# Patient Record
Sex: MALE | Race: BLACK OR AFRICAN AMERICAN | Employment: OTHER | URBAN - METROPOLITAN AREA
[De-identification: names, ages, dates, MRNs, and addresses within clinical notes are randomized per-mention and may not be internally consistent; named-entity substitution may affect disease eponyms.]

---

## 2017-02-21 PROBLEM — I49.5 SICK SINUS SYNDROME (HCC): Status: ACTIVE | Noted: 2017-02-21

## 2017-11-07 PROBLEM — I77.9 BILATERAL CAROTID ARTERY DISEASE (HCC): Status: ACTIVE | Noted: 2017-11-07

## 2018-01-23 PROBLEM — E11.21 TYPE 2 DIABETES MELLITUS WITH NEPHROPATHY (HCC): Status: ACTIVE | Noted: 2018-01-23

## 2018-04-16 PROBLEM — R42 RECURRENT VERTIGO: Status: ACTIVE | Noted: 2018-04-16

## 2018-06-27 ENCOUNTER — HOSPITAL ENCOUNTER (OUTPATIENT)
Dept: CT IMAGING | Age: 78
Discharge: HOME | End: 2018-06-27
Attending: OTOLARYNGOLOGY
Payer: MEDICARE

## 2018-06-27 DIAGNOSIS — J32.9 CHRONIC SINUSITIS, UNSPECIFIED LOCATION: ICD-10-CM

## 2018-06-27 PROCEDURE — 70486 CT MAXILLOFACIAL W/O DYE: CPT

## 2018-10-26 PROBLEM — R10.32 LEFT GROIN PAIN: Status: ACTIVE | Noted: 2018-10-26

## 2019-03-20 PROBLEM — R07.2 PRECORDIAL PAIN: Status: ACTIVE | Noted: 2019-03-20

## 2019-03-29 PROBLEM — N18.30 STAGE 3 CHRONIC KIDNEY DISEASE (HCC): Status: ACTIVE | Noted: 2019-03-29

## 2019-07-02 ENCOUNTER — HOSPITAL ENCOUNTER (OUTPATIENT)
Dept: LAB | Age: 79
Discharge: HOME | End: 2019-07-02
Payer: MEDICARE

## 2019-07-02 DIAGNOSIS — R07.2 PRECORDIAL PAIN: ICD-10-CM

## 2019-07-02 LAB
ANION GAP SERPL CALC-SCNC: 6 MMOL/L (ref 7–16)
BASOPHILS # BLD: 0 K/UL (ref 0–0.2)
BASOPHILS NFR BLD: 0 % (ref 0–2)
BNP SERPL-MCNC: 246 PG/ML
BUN SERPL-MCNC: 15 MG/DL (ref 8–23)
CALCIUM SERPL-MCNC: 9.1 MG/DL (ref 8.3–10.4)
CHLORIDE SERPL-SCNC: 106 MMOL/L (ref 98–107)
CO2 SERPL-SCNC: 28 MMOL/L (ref 21–32)
CREAT SERPL-MCNC: 1.65 MG/DL (ref 0.8–1.5)
DIFFERENTIAL METHOD BLD: ABNORMAL
EOSINOPHIL # BLD: 0.2 K/UL (ref 0–0.8)
EOSINOPHIL NFR BLD: 3 % (ref 0.5–7.8)
ERYTHROCYTE [DISTWIDTH] IN BLOOD BY AUTOMATED COUNT: 13.9 % (ref 11.9–14.6)
GLUCOSE SERPL-MCNC: 219 MG/DL (ref 65–100)
HCT VFR BLD AUTO: 44.2 % (ref 41.1–50.3)
HGB BLD-MCNC: 14.1 G/DL (ref 13.6–17.2)
IMM GRANULOCYTES # BLD AUTO: 0 K/UL (ref 0–0.5)
IMM GRANULOCYTES NFR BLD AUTO: 0 % (ref 0–5)
INR PPP: 1.3
LYMPHOCYTES # BLD: 1.7 K/UL (ref 0.5–4.6)
LYMPHOCYTES NFR BLD: 27 % (ref 13–44)
MCH RBC QN AUTO: 29.6 PG (ref 26.1–32.9)
MCHC RBC AUTO-ENTMCNC: 31.9 G/DL (ref 31.4–35)
MCV RBC AUTO: 92.9 FL (ref 79.6–97.8)
MONOCYTES # BLD: 0.6 K/UL (ref 0.1–1.3)
MONOCYTES NFR BLD: 10 % (ref 4–12)
NEUTS SEG # BLD: 3.8 K/UL (ref 1.7–8.2)
NEUTS SEG NFR BLD: 60 % (ref 43–78)
NRBC # BLD: 0 K/UL (ref 0–0.2)
PLATELET # BLD AUTO: 144 K/UL (ref 150–450)
PMV BLD AUTO: 11.6 FL (ref 9.4–12.3)
POTASSIUM SERPL-SCNC: 4.3 MMOL/L (ref 3.5–5.1)
PROTHROMBIN TIME: 16.4 SEC (ref 11.7–14.5)
RBC # BLD AUTO: 4.76 M/UL (ref 4.23–5.6)
SODIUM SERPL-SCNC: 140 MMOL/L (ref 136–145)
WBC # BLD AUTO: 6.4 K/UL (ref 4.3–11.1)

## 2019-07-02 PROCEDURE — 85025 COMPLETE CBC W/AUTO DIFF WBC: CPT

## 2019-07-02 PROCEDURE — 80048 BASIC METABOLIC PNL TOTAL CA: CPT

## 2019-07-02 PROCEDURE — 36415 COLL VENOUS BLD VENIPUNCTURE: CPT

## 2019-07-02 PROCEDURE — 85610 PROTHROMBIN TIME: CPT

## 2019-07-02 PROCEDURE — 83880 ASSAY OF NATRIURETIC PEPTIDE: CPT

## 2019-07-03 ENCOUNTER — HOSPITAL ENCOUNTER (OUTPATIENT)
Dept: CARDIAC CATH/INVASIVE PROCEDURES | Age: 79
Discharge: HOME | End: 2019-07-03
Attending: INTERNAL MEDICINE | Admitting: INTERNAL MEDICINE
Payer: MEDICARE

## 2019-07-03 VITALS
SYSTOLIC BLOOD PRESSURE: 160 MMHG | RESPIRATION RATE: 16 BRPM | HEART RATE: 69 BPM | WEIGHT: 220 LBS | TEMPERATURE: 98.1 F | HEIGHT: 75 IN | BODY MASS INDEX: 27.35 KG/M2 | DIASTOLIC BLOOD PRESSURE: 71 MMHG | OXYGEN SATURATION: 98 %

## 2019-07-03 LAB
ALBUMIN SERPL-MCNC: 3.8 G/DL (ref 3.2–4.6)
ALBUMIN/GLOB SERPL: 1.1 {RATIO} (ref 1.2–3.5)
ALP SERPL-CCNC: 118 U/L (ref 50–136)
ALT SERPL-CCNC: 23 U/L (ref 12–65)
ANION GAP SERPL CALC-SCNC: 6 MMOL/L (ref 7–16)
AST SERPL-CCNC: 18 U/L (ref 15–37)
BILIRUB SERPL-MCNC: 0.4 MG/DL (ref 0.2–1.1)
BUN SERPL-MCNC: 13 MG/DL (ref 8–23)
CALCIUM SERPL-MCNC: 8.9 MG/DL (ref 8.3–10.4)
CHLORIDE SERPL-SCNC: 107 MMOL/L (ref 98–107)
CO2 SERPL-SCNC: 28 MMOL/L (ref 21–32)
CREAT SERPL-MCNC: 1.59 MG/DL (ref 0.8–1.5)
GLOBULIN SER CALC-MCNC: 3.5 G/DL (ref 2.3–3.5)
GLUCOSE SERPL-MCNC: 167 MG/DL (ref 65–100)
POTASSIUM SERPL-SCNC: 4.7 MMOL/L (ref 3.5–5.1)
PROT SERPL-MCNC: 7.3 G/DL (ref 6.3–8.2)
SODIUM SERPL-SCNC: 141 MMOL/L (ref 136–145)

## 2019-07-03 PROCEDURE — 99152 MOD SED SAME PHYS/QHP 5/>YRS: CPT

## 2019-07-03 PROCEDURE — 77030029997 HC DEV COM RDL R BND TELE -B

## 2019-07-03 PROCEDURE — 77030004559 HC CATH ANGI DX SUPT CARD -B

## 2019-07-03 PROCEDURE — 77030012678 HC VLV HEMSTAS ABBT -B

## 2019-07-03 PROCEDURE — 77030004558 HC CATH ANGI DX SUPR TORQ CARD -A

## 2019-07-03 PROCEDURE — 74011000250 HC RX REV CODE- 250: Performed by: INTERNAL MEDICINE

## 2019-07-03 PROCEDURE — 80053 COMPREHEN METABOLIC PANEL: CPT

## 2019-07-03 PROCEDURE — C1894 INTRO/SHEATH, NON-LASER: HCPCS

## 2019-07-03 PROCEDURE — C1769 GUIDE WIRE: HCPCS

## 2019-07-03 PROCEDURE — 74011250636 HC RX REV CODE- 250/636: Performed by: INTERNAL MEDICINE

## 2019-07-03 PROCEDURE — 74011250636 HC RX REV CODE- 250/636

## 2019-07-03 PROCEDURE — 74011636320 HC RX REV CODE- 636/320: Performed by: INTERNAL MEDICINE

## 2019-07-03 PROCEDURE — 99153 MOD SED SAME PHYS/QHP EA: CPT

## 2019-07-03 PROCEDURE — 93458 L HRT ARTERY/VENTRICLE ANGIO: CPT

## 2019-07-03 RX ORDER — GUAIFENESIN 100 MG/5ML
81-324 LIQUID (ML) ORAL ONCE
Status: DISCONTINUED | OUTPATIENT
Start: 2019-07-03 | End: 2019-07-03 | Stop reason: HOSPADM

## 2019-07-03 RX ORDER — SODIUM CHLORIDE 0.9 % (FLUSH) 0.9 %
5-40 SYRINGE (ML) INJECTION AS NEEDED
Status: CANCELLED | OUTPATIENT
Start: 2019-07-03

## 2019-07-03 RX ORDER — NALOXONE HYDROCHLORIDE 0.4 MG/ML
0.4 INJECTION, SOLUTION INTRAMUSCULAR; INTRAVENOUS; SUBCUTANEOUS AS NEEDED
Status: CANCELLED | OUTPATIENT
Start: 2019-07-03

## 2019-07-03 RX ORDER — MORPHINE SULFATE 2 MG/ML
1 INJECTION, SOLUTION INTRAMUSCULAR; INTRAVENOUS
Status: CANCELLED | OUTPATIENT
Start: 2019-07-03

## 2019-07-03 RX ORDER — ONDANSETRON 2 MG/ML
4 INJECTION INTRAMUSCULAR; INTRAVENOUS
Status: CANCELLED | OUTPATIENT
Start: 2019-07-03 | End: 2019-07-04

## 2019-07-03 RX ORDER — SODIUM CHLORIDE 9 MG/ML
75 INJECTION, SOLUTION INTRAVENOUS CONTINUOUS
Status: CANCELLED | OUTPATIENT
Start: 2019-07-03

## 2019-07-03 RX ORDER — SODIUM CHLORIDE 9 MG/ML
75 INJECTION, SOLUTION INTRAVENOUS CONTINUOUS
Status: DISCONTINUED | OUTPATIENT
Start: 2019-07-03 | End: 2019-07-03 | Stop reason: HOSPADM

## 2019-07-03 RX ORDER — FENTANYL CITRATE 50 UG/ML
25-100 INJECTION, SOLUTION INTRAMUSCULAR; INTRAVENOUS
Status: DISCONTINUED | OUTPATIENT
Start: 2019-07-03 | End: 2019-07-03 | Stop reason: HOSPADM

## 2019-07-03 RX ORDER — HYDROCODONE BITARTRATE AND ACETAMINOPHEN 5; 325 MG/1; MG/1
1 TABLET ORAL
Status: CANCELLED | OUTPATIENT
Start: 2019-07-03

## 2019-07-03 RX ORDER — HEPARIN SODIUM 200 [USP'U]/100ML
2 INJECTION, SOLUTION INTRAVENOUS CONTINUOUS
Status: DISCONTINUED | OUTPATIENT
Start: 2019-07-03 | End: 2019-07-03 | Stop reason: HOSPADM

## 2019-07-03 RX ORDER — ACETAMINOPHEN 325 MG/1
650 TABLET ORAL
Status: CANCELLED | OUTPATIENT
Start: 2019-07-03

## 2019-07-03 RX ORDER — MIDAZOLAM HYDROCHLORIDE 1 MG/ML
.5-2 INJECTION, SOLUTION INTRAMUSCULAR; INTRAVENOUS
Status: DISCONTINUED | OUTPATIENT
Start: 2019-07-03 | End: 2019-07-03 | Stop reason: HOSPADM

## 2019-07-03 RX ORDER — SODIUM CHLORIDE 0.9 % (FLUSH) 0.9 %
5-40 SYRINGE (ML) INJECTION EVERY 8 HOURS
Status: CANCELLED | OUTPATIENT
Start: 2019-07-03

## 2019-07-03 RX ORDER — DIAZEPAM 5 MG/1
5 TABLET ORAL ONCE
Status: DISCONTINUED | OUTPATIENT
Start: 2019-07-03 | End: 2019-07-03 | Stop reason: HOSPADM

## 2019-07-03 RX ORDER — LIDOCAINE HYDROCHLORIDE 10 MG/ML
2-20 INJECTION INFILTRATION; PERINEURAL
Status: DISCONTINUED | OUTPATIENT
Start: 2019-07-03 | End: 2019-07-03 | Stop reason: HOSPADM

## 2019-07-03 RX ADMIN — HEPARIN SODIUM 2 ML/HR: 5000 INJECTION, SOLUTION INTRAVENOUS; SUBCUTANEOUS at 12:27

## 2019-07-03 RX ADMIN — IOPAMIDOL 85 ML: 755 INJECTION, SOLUTION INTRAVENOUS at 13:03

## 2019-07-03 RX ADMIN — SODIUM CHLORIDE 75 ML/HR: 900 INJECTION, SOLUTION INTRAVENOUS at 10:05

## 2019-07-03 RX ADMIN — VERAPAMIL HYDROCHLORIDE 2 ML: 2.5 INJECTION, SOLUTION INTRAVENOUS at 12:40

## 2019-07-03 RX ADMIN — LIDOCAINE HYDROCHLORIDE 4 ML: 10 INJECTION, SOLUTION INFILTRATION; PERINEURAL at 12:34

## 2019-07-03 RX ADMIN — FENTANYL CITRATE 50 MCG: 50 INJECTION, SOLUTION INTRAMUSCULAR; INTRAVENOUS at 12:24

## 2019-07-03 RX ADMIN — MIDAZOLAM HYDROCHLORIDE 2 MG: 1 INJECTION, SOLUTION INTRAMUSCULAR; INTRAVENOUS at 12:24

## 2019-07-03 NOTE — PROGRESS NOTES
Ambulated to bathroom. Right wrist oozing. Pressure applied with dsg changed. Assisted back to bed to monitor wrist x 15-20 mins.

## 2019-07-03 NOTE — PROCEDURES
[facility]  CARDIAC CATH    Name:  Max Underwood  MR#:  [id number]  :  1940  ACCOUNT #:  [de-identified]  DATE OF SERVICE:  2019    REFERRING PHYSICIAN:  Dr. Alejandro Huggins. PROCEDURES PERFORMED:  Left heart catheterization. PREOPERATIVE DIAGNOSES:  Angina pectoris. POSTOPERATIVE DIAGNOSES:  Coronary artery disease. SURGEON:  Maurice Stokes MD    ASSISTANT:  None. ESTIMATED BLOOD LOSS:  5 mL. SPECIMENS REMOVED:  None. COMPLICATIONS:  None. IMPLANTS:  None. ANESTHESIA:  The patient was given 2 mg of Versed and 25 mcg of fentanyl, monitored conscious sedation beginning at 1227 and ending at 1305 by nurse, Flavio Castillo. PROCEDURE:  After informed consent, the patient was prepped and draped in the usual sterile fashion. The right wrist was infiltrated with lidocaine. The right radial artery was accessed by the modified Seldinger technique with a 6-[place] sheath. A total of 85 mL of Isovue contrast was used for the entire procedure. Terumo band was used for hemostasis. CATHETERS USED:  Included a 6-[place] JL-3.5; 6-[place] JR-5; 6-[place] angled pigtail catheter. FINDINGS:  Left ventricle:  Left ventricular ejection fraction is estimated at 55-60% with normal wall motion. LVEDP:  17 mmHg. Left main:  Normal.    Left anterior descending:  Left anterior descending artery had mild luminal irregularities throughout. It is a large LAD wrapped around the apex and provided perfusion to half of the PDA. Circumflex artery:  Normal.    Right coronary artery:  The right coronary artery was angiographically normal and was a small codominant artery. Of note, the patient had a very tortuous right subclavian artery and that we may consider future interventions via the femoral approach because of the tortuosity of this artery. CONCLUSION:  This is a 65-year-old male with mild nonobstructive coronary artery disease.   We will have him follow up with Dr. Bronson Mcelroy in the next 2 weeks for ongoing medical management.         Martha Goldberg MD      [id number]  D:  07/03/2019 13:12  T:  07/03/2019 13:19  JOB #:  [id number]

## 2019-07-03 NOTE — PROCEDURES
Brief Cardiac Procedure Note    Patient: Lesley Arguello MRN: [id number]  SSN: [id number]    YOB: 1940  Age: 77 y.o. Sex: male      Date of Procedure: 7/3/2019     Pre-procedure Diagnosis: Atypical Angina    Post-procedure Diagnosis: Coronary Artery Disease    Reason for Procedure: New Onset Angina < or = 2 Months    Procedure: Left Heart Catheterization    Brief Description of Procedure: LHC via R radial artery. Performed By: Jacquline Mcburney, MD     Assistants: None    Anesthesia: Moderate Sedation    Estimated Blood Loss: Less than 10 mL      Specimens: None    Implants: None    Findings: LM  Normal, LAD luminal irregs, Circ luminal irregs, RCA small co-dom. LVEF 55-60%. LVEDP 17 mmHg. Complications: None    Recommendations: Continue medical therapy.     Signed By: Jacquline Mcburney, MD     July 3, 2019

## 2019-07-03 NOTE — PROGRESS NOTES
TRANSFER - OUT REPORT:    Verbal report given to Saeed Becerra RN(name) on Alan Hutchins  being transferred to CPRU(unit) for routine progression of care       Report consisted of patients Situation, Background, Assessment and   Recommendations(SBAR). Information from the following report(s) Procedure Summary, MAR and Cardiac Rhythm AFIB was reviewed with the receiving nurse. Lines:   Peripheral IV 07/03/19 Left Antecubital (Active)        Opportunity for questions and clarification was provided.       Patient transported with:   Registered Nurse     [facility] Dr Sweeney Pump  Diagnostic Only  Right radial access - TR band @ 1310 w/ 11 ml air in band - aite C/D/i  Versed 2mg IV  Fent 50 mcg IV

## 2019-07-03 NOTE — PROGRESS NOTES
Pt arrived, ambulated to room with no visible problems, planned C for Dr Cody Anglin. Consent signed, Procedure discussed with pt all questions answered voiced understanding. Medications and history discussed with pt. Pt prepped per ordersThe patient has a fraility score of 3-MANAGING WELL, based on ability to complete ADls without assistance, increased symptoms on exertion.       Patient took Aspirin 324mg  today at 0730 prior to arrival.

## 2019-07-03 NOTE — PROGRESS NOTES
TR band removed with 4x4 gauze dsg and tegaderm applied to right wrist. No bleeding or swelling noted. Discharge instructions given with wife at bedside.

## 2019-07-03 NOTE — DISCHARGE INSTRUCTIONS
HEART CATHETERIZATION/ANGIOGRAPHY DISCHARGE INSTRUCTIONS    1. Check puncture site frequently for swelling or bleeding. If there is any bleeding, lie down and apply pressure over the area with a clean towel or washcloth. Notify your doctor for any redness, swelling, drainage, or oozing from the puncture site. Notify your doctor for any fever or chills. 2. If the extremity becomes cold, numb, or painful call [facility] at [phone].  3. Activity should be limited for the next 48 hours. Climb stairs as little as possible and avoid any stooping, bending, or strenuous activity for 48 hours. No heavy lifting (anything over 10 pounds) for 3 days. 4. You may resume your usual diet. Drink more fluids than usual.  5. Have a responsible person drive you home and stay with you for at least 24 hours after your heart catheterization/angiography. 6. You may remove bandage from your Right wrist in 24 hours. You may shower in 24 hours. No tub baths, hot tubs, or swimming for 1 week. Do not place any lotions, creams, powders, or ointments over puncture site for 1 week. You may place a clean band-aid over the puncture site each day for 5 days. Change daily. I have read the above instructions and have had the opportunity to ask questions.       Patient: ________________________   Date: 7/3/2019    Witness: _______________________   Date: 7/3/2019

## 2019-07-03 NOTE — PROGRESS NOTES
Report received from [address] [place] [address]. Procedural findings communicated. Intra procedural  medication administration reviewed. Progression of care discussed.      Patient received into [address] [facility] 3 post sheath removal.     Access site without bleeding or swelling yes    Dressing dry and intact yes    Patient instructed to limit movement to right upper extremity    Routine post procedural vital signs and site assessment initiated yes

## 2019-08-01 PROBLEM — I50.32 DIASTOLIC CHF, CHRONIC (HCC): Status: ACTIVE | Noted: 2019-08-01

## 2020-12-31 ENCOUNTER — TRANSCRIBE ORDER (OUTPATIENT)
Dept: SCHEDULING | Age: 80
End: 2020-12-31

## 2020-12-31 DIAGNOSIS — R09.89 DIMINISHED PULSES IN LOWER EXTREMITY: Primary | ICD-10-CM

## 2021-01-04 ENCOUNTER — HOSPITAL ENCOUNTER (OUTPATIENT)
Dept: ULTRASOUND IMAGING | Age: 81
Discharge: HOME | End: 2021-01-04
Attending: PODIATRIST
Payer: MEDICARE

## 2021-01-04 DIAGNOSIS — R09.89 DIMINISHED PULSES IN LOWER EXTREMITY: ICD-10-CM

## 2021-01-04 PROCEDURE — 93925 LOWER EXTREMITY STUDY: CPT

## 2021-12-16 PROBLEM — F11.99 OPIOID USE, UNSPECIFIED WITH UNSPECIFIED OPIOID-INDUCED DISORDER (HCC): Status: ACTIVE | Noted: 2021-12-16

## 2022-03-18 PROBLEM — R42 RECURRENT VERTIGO: Status: ACTIVE | Noted: 2018-04-16

## 2022-03-18 PROBLEM — F11.99 OPIOID USE, UNSPECIFIED WITH UNSPECIFIED OPIOID-INDUCED DISORDER (HCC): Status: ACTIVE | Noted: 2021-12-16

## 2022-03-19 PROBLEM — N18.30 STAGE 3 CHRONIC KIDNEY DISEASE (HCC): Status: ACTIVE | Noted: 2019-03-29

## 2022-03-19 PROBLEM — R10.32 LEFT GROIN PAIN: Status: ACTIVE | Noted: 2018-10-26

## 2022-03-19 PROBLEM — I77.9 BILATERAL CAROTID ARTERY DISEASE (HCC): Status: ACTIVE | Noted: 2017-11-07

## 2022-03-19 PROBLEM — E11.21 TYPE 2 DIABETES MELLITUS WITH NEPHROPATHY (HCC): Status: ACTIVE | Noted: 2018-01-23

## 2022-03-19 PROBLEM — I50.32 DIASTOLIC CHF, CHRONIC (HCC): Status: ACTIVE | Noted: 2019-08-01

## 2022-03-19 PROBLEM — I49.5 SICK SINUS SYNDROME (HCC): Status: ACTIVE | Noted: 2017-02-21

## 2022-03-19 PROBLEM — R07.2 PRECORDIAL PAIN: Status: ACTIVE | Noted: 2019-03-20

## 2022-08-08 ENCOUNTER — OFFICE VISIT (OUTPATIENT)
Dept: CARDIOLOGY CLINIC | Age: 82
End: 2022-08-08
Payer: MEDICARE

## 2022-08-08 VITALS
SYSTOLIC BLOOD PRESSURE: 120 MMHG | HEIGHT: 74 IN | BODY MASS INDEX: 28.37 KG/M2 | DIASTOLIC BLOOD PRESSURE: 72 MMHG | HEART RATE: 78 BPM

## 2022-08-08 DIAGNOSIS — I50.32 DIASTOLIC CHF, CHRONIC (HCC): ICD-10-CM

## 2022-08-08 DIAGNOSIS — I48.11 LONGSTANDING PERSISTENT ATRIAL FIBRILLATION (HCC): Primary | ICD-10-CM

## 2022-08-08 DIAGNOSIS — I10 PRIMARY HYPERTENSION: ICD-10-CM

## 2022-08-08 DIAGNOSIS — R07.2 PRECORDIAL PAIN: ICD-10-CM

## 2022-08-08 PROCEDURE — 99214 OFFICE O/P EST MOD 30 MIN: CPT | Performed by: INTERNAL MEDICINE

## 2022-08-08 PROCEDURE — G8417 CALC BMI ABV UP PARAM F/U: HCPCS | Performed by: INTERNAL MEDICINE

## 2022-08-08 PROCEDURE — 1036F TOBACCO NON-USER: CPT | Performed by: INTERNAL MEDICINE

## 2022-08-08 PROCEDURE — 93000 ELECTROCARDIOGRAM COMPLETE: CPT | Performed by: INTERNAL MEDICINE

## 2022-08-08 PROCEDURE — G8427 DOCREV CUR MEDS BY ELIG CLIN: HCPCS | Performed by: INTERNAL MEDICINE

## 2022-08-08 PROCEDURE — 1123F ACP DISCUSS/DSCN MKR DOCD: CPT | Performed by: INTERNAL MEDICINE

## 2022-08-08 NOTE — PROGRESS NOTES
[address], [address] [facility], [address] [place] [address]  PHONE: [phone]     22    NAME:  Fabienne eNw  : 1940  MRN: [id number]       SUBJECTIVE:   Fabienne New is a 79 y.o. male seen for a follow up visit regarding the following:     Chief Complaint   Patient presents with    Hypertension    Atrial Fibrillation       HPI:  Here for eval of Afib. Carotid US 2018: mild   LHC 7/3/19: mild CAD   Echo 2019: normal EF, mild AI and MR   2021: Normal myocardial perfusion imaging. Myocardial perfusion imaging supports a low risk stress test.     Sleeping more, more tired as well. Off aldactone with breast swelling. Some left sided CP, lack of exercise tolerance. Some NIELSEN. Some SOB, this is new. Doing well on anticoagulation treatment as reviewed today, no bleeding issues or excessive bruising noted. Past Medical History, Past Surgical History, Family history, Social History, and Medications were all reviewed with the patient today and updated as necessary. Current Outpatient Medications   Medication Sig Dispense Refill    acetaminophen (TYLENOL) 500 MG tablet Take 1,000 mg by mouth 2 times daily      amLODIPine (NORVASC) 10 MG tablet Take 10 mg by mouth daily      apixaban (ELIQUIS) 5 MG TABS tablet Take 5 mg by mouth in the morning. atorvastatin (LIPITOR) 40 MG tablet TAKE 1 TABLET EVERY EVENING      budesonide-formoterol (SYMBICORT) 160-4.5 MCG/ACT AERO Inhale 2 puffs into the lungs 2 times daily      ACETAMINOPHEN-BUTALBITAL  MG TABS Take by mouth every 6 hours as needed      vitamin D3 (CHOLECALCIFEROL) 125 MCG (5000 UT) TABS tablet Take by mouth daily      clotrimazole-betamethasone (LOTRISONE) 1-0.05 % cream Apply topically 2 times daily      colchicine (MITIGARE) 0.6 MG capsule For gout flare take 2 capsules and repeat with 1 capsule in 2 hours.       cyanocobalamin 1000 MCG tablet Take 1,000 mcg by mouth daily      diclofenac sodium (VOLTAREN) 1 % GEL Diclofenac 1 % topical gel      fluocinolone acetonide (SYNALAR) 0.01 % external solution Apply topically 2 times daily      fluocinolone (DERMOTIC) 0.01 % OIL oil Place 5 drops in ear(s) 2 times daily      furosemide (LASIX) 40 MG tablet TAKE 1 TABLET EVERY DAY      glipiZIDE (GLUCOTROL XL) 10 MG extended release tablet TAKE 2 TABLETS EVERY DAY      Hyoscyamine Sulfate SL 0.125 MG SUBL TAKE 1 TABLET UNDER THE TONGUE EVERY DAY AS NEEDED FOR STOMACH      Insulin Glargine, 1 Unit Dial, (TOUJEO SOLOSTAR) 300 UNIT/ML SOPN Inject 22 Units into the skin      insulin regular (HUMULIN R;NOVOLIN R) 100 UNIT/ML injection Inject 6 Units into the skin 2 times daily (before meals)      levocetirizine (XYZAL) 5 MG tablet Take 5 mg by mouth daily      meclizine (ANTIVERT) 25 MG tablet TAKE 1 TABLET THREE TIMES DAILY AS NEEDED      montelukast (SINGULAIR) 10 MG tablet Take 10 mg by mouth daily as needed      omeprazole (PRILOSEC) 20 MG delayed release capsule TAKE 1 CAPSULE TWICE DAILY      pioglitazone (ACTOS) 15 MG tablet Take 15 mg by mouth daily      Sodium Chloride 3 % AERS as needed      SITagliptin (JANUVIA) 100 MG tablet TAKE 1 TABLET EVERY DAY      SODIUM FLUORIDE, DENTAL GEL, 1.1 % GEL Place 1 each onto teeth as needed      tamsulosin (FLOMAX) 0.4 MG capsule Take 0.4 mg by mouth      tiZANidine (ZANAFLEX) 2 MG tablet Take 2 mg by mouth 3 times daily as needed      triamcinolone (KENALOG) 0.1 % cream Apply topically 2 times daily      triamcinolone (NASACORT) 55 MCG/ACT nasal inhaler 2 sprays daily as needed      doxepin (SINEQUAN) 25 MG capsule Take 25 mg by mouth (Patient not taking: Reported on 8/8/2022)      hydroCHLOROthiazide (HYDRODIURIL) 25 MG tablet Take 25 mg by mouth daily      pregabalin (LYRICA) 50 MG capsule TAKE 1 CAPSULE BY MOUTH THREE TIMES DAILY FOR DIABETIC COMPLICATION CAUSING INJURY TO SOME BODY NERVES (Patient not taking: Reported on 8/8/2022)      pregabalin (LYRICA) 75 MG capsule Take 75 mg by mouth 3 times daily. (Patient not taking: Reported on 8/8/2022)      spironolactone (ALDACTONE) 25 MG tablet Take 25 mg by mouth daily (Patient not taking: Reported on 8/8/2022)       No current facility-administered medications for this visit.         Allergies   Allergen Reactions    Hydrocodone-Acetaminophen Itching    Morphine Nausea Only    Tramadol Diarrhea, Itching and Nausea Only     Patient Active Problem List    Diagnosis Date Noted    Opioid use, unspecified with unspecified opioid-induced disorder ([facility] 38/31/0727    Diastolic CHF, chronic ([address] 08/01/2019    Stage 3 chronic kidney disease ([address] 03/29/2019    Precordial pain 03/20/2019    Left groin pain 10/26/2018    Recurrent vertigo 04/16/2018    Type 2 diabetes mellitus with nephropathy ([address] 01/23/2018    Bilateral carotid artery disease ([address] 11/07/2017    Sick sinus syndrome ([facility] [address] 02/21/2017    Encounter for long-term (current) use of medications 04/22/2016    Hypertension      Normal [facility] in [facility] 2002 and 2003        Carotid artery stenosis without cerebral infarction 10/06/2015     US 2/14 Mild dz bilaterally        Aortic valve regurgitation 10/06/2015     Echo 8/15 normal EF, mild AI and MR        Impotence of organic origin 06/30/2015    Hyperlipemia 06/30/2015    Atrial fibrillation ([address] 06/30/2015    Gout, unspecified 06/30/2015    Diabetic neuropathy ([address] 06/30/2015    Tietze's disease     Tension headache     GERD (gastroesophageal reflux disease)      meds control         Diabetes ([address]      type diabetic - insulin dependent-range in 1 month:         Chronic pain      low back pain- not relieved by pain meds          Past Surgical History:   Procedure Laterality Date    ANTERIOR CRUCIATE LIGAMENT REPAIR Right     APPENDECTOMY      BREAST SURGERY      CHEST SURGERY      GI      HAMMER TOE SURGERY Left 03/2017    HEENT      HERNIA REPAIR  6/2010    inguinal    NEUROLOGICAL SURGERY      ORTHOPEDIC SURGERY      r rotator cuff    ORTHOPEDIC SURGERY      l knee scope    ORTHOPEDIC SURGERY      left elbow    OTHER SURGICAL HISTORY      groin surgery    OTHER SURGICAL HISTORY      PACEMAKER      FL ABDOMEN SURGERY PROC UNLISTED      FL CARDIAC SURG PROCEDURE UNLIST      PROSTATECTOMY      SHOULDER ARTHROSCOPY Right     UROLOGICAL SURGERY      VASCULAR SURGERY      VASCULAR SURGERY       Family History   Problem Relation Age of Onset    Stomach Cancer Father     Stroke Mother     Other Brother 25        polio meningitis @ age 25 confined to wheelchair     Other Brother         aneurysm    Diabetes Brother      Social History     Tobacco Use    Smoking status: Never    Smokeless tobacco: Never   Substance Use Topics    Alcohol use: No         ROS:    No obvious pertinent positives on review of systems except for what was outlined in the HPI today. PHYSICAL EXAM:     /72   Pulse 78   Ht 6' 2\" (1.88 m)   BMI 28.37 kg/m²    General/Constitutional:   Alert and oriented x 3, no acute distress  HEENT:   normocephalic, atraumatic, moist mucous membranes  Neck:   No JVD or carotid bruits bilaterally  Cardiovascular:  irreg, no murmur/rub/gallop appreciated  Pulmonary:   clear to auscultation bilaterally, no respiratory distress  Abdomen:   soft, non-tender, non-distended  Ext:   No sig LE edema bilaterally  Skin:  warm and dry, no obvious rashes seen  Neuro:   no obvious sensory or motor deficits  Psychiatric:   normal mood and affect      EKG Today and independently reviewed by me: Afib, normal intervals and non-specific ST/T wave changes.         Lab Results   Component Value Date/Time     12/17/2021 08:24 AM    K 4.7 12/17/2021 08:24 AM     12/17/2021 08:24 AM    CO2 21 12/17/2021 08:24 AM    BUN 15 12/17/2021 08:24 AM    CREATININE 1.68 12/17/2021 08:24 AM    GLUCOSE 153 12/17/2021 08:24 AM    CALCIUM 9.5 12/17/2021 08:24 AM        Lab Results   Component Value Date    WBC 7.6 12/17/2021    HGB 14.6 12/17/2021    HCT 44.5 12/17/2021 MCV 91 12/17/2021     12/17/2021       Lab Results   Component Value Date    TSH 4.020 03/15/2021       Lab Results   Component Value Date    LABA1C 7.2 (H) 11/19/2021     Lab Results   Component Value Date     11/19/2021       Lab Results   Component Value Date    CHOL 124 03/15/2021    CHOL 110 11/14/2019     Lab Results   Component Value Date    TRIG 110 03/15/2021    TRIG 61 11/14/2019     Lab Results   Component Value Date    HDL 44 03/15/2021    HDL 45 11/14/2019     Lab Results   Component Value Date    LDLCALC 60 03/15/2021    LDLCALC 53 11/14/2019     Lab Results   Component Value Date    LABVLDL 12 11/14/2019    VLDL 20 03/15/2021     No results found for: CHOLCORBY        I have Independently reviewed prior care notes, any ER records available, cardiac testing, labs and results with the patient and before seeing the patient today. Also independently reviewed outside records when available. ASSESSMENT:    Mathew Lam was seen today for hypertension and atrial fibrillation. Diagnoses and all orders for this visit:    Atrial fibrillation ([address]  -     EKG 12 lead    Hypertension  -     EKG 12 lead    Diastolic CHF, chronic (HCC)    Precordial pain  -     Nuclear stress test with myocardial perfusion; Future        PLAN:      1. CDHF: remain on meds. Lasix PRN. Follow Cr and K. Off aldactone with breast pain. 2.  HTN:  on norvasc, better now, follow. 3.  PAF/SSS:  Off the BB for now. Follow for SSS, no PM needed now. Doing well on NOAC, follow. Reduce NOAC in future if Cr remains > 1.5.       4. CP/NIELSEN/SOB:  Given these risk factors and symptoms as outlined, plan for NST. We did review options of NST, LHC, other stress testing and agreed to proceed with NST in our office. To ER for worsening angina. Plan on definitive LHC for worsening angina.          5. . Carotid Dz: Follow in the future. 6. CKD: follow labs.     Patient has been instructed and agrees to call our office with any issues or other concerns related to their cardiac condition(s) and/or complaint(s).         Return for Return After Test.       LUCIANO BRUNO,   8/8/2022

## 2022-09-08 ENCOUNTER — TELEPHONE (OUTPATIENT)
Dept: CARDIOLOGY CLINIC | Age: 82
End: 2022-09-08

## 2022-09-12 ENCOUNTER — OFFICE VISIT (OUTPATIENT)
Dept: CARDIOLOGY CLINIC | Age: 82
End: 2022-09-12
Payer: MEDICARE

## 2022-09-12 VITALS
DIASTOLIC BLOOD PRESSURE: 60 MMHG | SYSTOLIC BLOOD PRESSURE: 98 MMHG | HEART RATE: 88 BPM | HEIGHT: 74 IN | WEIGHT: 213.8 LBS | BODY MASS INDEX: 27.44 KG/M2

## 2022-09-12 DIAGNOSIS — I35.1 NONRHEUMATIC AORTIC VALVE INSUFFICIENCY: ICD-10-CM

## 2022-09-12 DIAGNOSIS — I48.11 LONGSTANDING PERSISTENT ATRIAL FIBRILLATION (HCC): ICD-10-CM

## 2022-09-12 DIAGNOSIS — I50.32 DIASTOLIC CHF, CHRONIC (HCC): Primary | ICD-10-CM

## 2022-09-12 PROCEDURE — 1036F TOBACCO NON-USER: CPT | Performed by: INTERNAL MEDICINE

## 2022-09-12 PROCEDURE — 99214 OFFICE O/P EST MOD 30 MIN: CPT | Performed by: INTERNAL MEDICINE

## 2022-09-12 PROCEDURE — G8417 CALC BMI ABV UP PARAM F/U: HCPCS | Performed by: INTERNAL MEDICINE

## 2022-09-12 PROCEDURE — 1123F ACP DISCUSS/DSCN MKR DOCD: CPT | Performed by: INTERNAL MEDICINE

## 2022-09-12 PROCEDURE — G8428 CUR MEDS NOT DOCUMENT: HCPCS | Performed by: INTERNAL MEDICINE

## 2022-09-12 RX ORDER — CIPROFLOXACIN AND DEXAMETHASONE 3; 1 MG/ML; MG/ML
4 SUSPENSION/ DROPS AURICULAR (OTIC) 2 TIMES DAILY
COMMUNITY
Start: 2022-06-01

## 2022-09-12 RX ORDER — LOSARTAN POTASSIUM 25 MG/1
25 TABLET ORAL DAILY
COMMUNITY
Start: 2022-06-30

## 2022-09-12 RX ORDER — ESOMEPRAZOLE MAGNESIUM 40 MG/1
CAPSULE, DELAYED RELEASE ORAL
COMMUNITY
Start: 2022-07-11

## 2022-09-12 RX ORDER — GABAPENTIN 100 MG/1
CAPSULE ORAL
COMMUNITY
Start: 2022-09-09

## 2022-09-12 RX ORDER — PEN NEEDLE, DIABETIC 32GX 5/32"
NEEDLE, DISPOSABLE MISCELLANEOUS
Qty: 400 EACH | Refills: 5 | OUTPATIENT
Start: 2022-09-12

## 2022-09-12 NOTE — PROGRESS NOTES
[address], [address] [facility], [address] [place] [address]  PHONE: [phone]     22    NAME:  Kev Cano  : 1940  MRN: [id number]       SUBJECTIVE:   Kev Cano is a 79 y.o. male seen for a follow up visit regarding the following:     Chief Complaint   Patient presents with    Chest Pain     ERICA       HPI: Here for eval of Afib. Carotid US 2018: mild   LHC 7/3/19: mild CAD   Echo 2019: normal EF, mild AI and MR  NST 2022: LV perfusion is normal.     NO more CP. NIELSEN ok now, not worsening. NO new angina. Sleeping more, more tired as well. Off aldactone with breast swelling. Patient denies recent history of orthopnea, PND, excessive dizziness and/or syncope. Doing well on anticoagulation treatment as reviewed today, no bleeding issues or excessive bruising noted. Past Medical History, Past Surgical History, Family history, Social History, and Medications were all reviewed with the patient today and updated as necessary. Current Outpatient Medications   Medication Sig Dispense Refill    ciprofloxacin-dexamethasone (CIPRODEX) 0.3-0.1 % otic suspension Place 4 drops in ear(s) 2 times daily      esomeprazole (NEXIUM) 40 MG delayed release capsule       gabapentin (NEURONTIN) 100 MG capsule TAKE 2 CAPSULES BY MOUTH NIGHTLY      hydrocortisone 2.5 % cream Apply topically 2 times daily as needed      losartan (COZAAR) 25 MG tablet Take 25 mg by mouth daily      acetaminophen (TYLENOL) 500 MG tablet Take 1,000 mg by mouth 2 times daily      apixaban (ELIQUIS) 5 MG TABS tablet Take 5 mg by mouth in the morning.       atorvastatin (LIPITOR) 40 MG tablet TAKE 1 TABLET EVERY EVENING      budesonide-formoterol (SYMBICORT) 160-4.5 MCG/ACT AERO Inhale 2 puffs into the lungs 2 times daily      vitamin D3 (CHOLECALCIFEROL) 125 MCG (5000 UT) TABS tablet Take by mouth daily      clotrimazole-betamethasone (LOTRISONE) 1-0.05 % cream Apply topically 2 times daily      colchicine (MITIGARE) 0.6 MG capsule For gout flare take 2 capsules and repeat with 1 capsule in 2 hours.       cyanocobalamin 1000 MCG tablet Take 1,000 mcg by mouth daily      diclofenac sodium (VOLTAREN) 1 % GEL Diclofenac 1 % topical gel      fluocinolone acetonide (SYNALAR) 0.01 % external solution Apply topically 2 times daily      furosemide (LASIX) 40 MG tablet TAKE 1 TABLET EVERY DAY      Hyoscyamine Sulfate SL 0.125 MG SUBL TAKE 1 TABLET UNDER THE TONGUE EVERY DAY AS NEEDED FOR STOMACH      Insulin Glargine, 1 Unit Dial, (TOUJEO SOLOSTAR) 300 UNIT/ML SOPN Inject 22 Units into the skin      insulin regular (HUMULIN R;NOVOLIN R) 100 UNIT/ML injection Inject 6 Units into the skin 2 times daily (before meals)      levocetirizine (XYZAL) 5 MG tablet Take 5 mg by mouth daily      meclizine (ANTIVERT) 25 MG tablet TAKE 1 TABLET THREE TIMES DAILY AS NEEDED      montelukast (SINGULAIR) 10 MG tablet Take 10 mg by mouth daily as needed      omeprazole (PRILOSEC) 20 MG delayed release capsule TAKE 1 CAPSULE TWICE DAILY      SITagliptin (JANUVIA) 100 MG tablet TAKE 1 TABLET EVERY DAY      tamsulosin (FLOMAX) 0.4 MG capsule Take 0.4 mg by mouth      triamcinolone (KENALOG) 0.1 % cream Apply topically 2 times daily      triamcinolone (NASACORT) 55 MCG/ACT nasal inhaler 2 sprays daily as needed      amLODIPine (NORVASC) 10 MG tablet Take 10 mg by mouth daily      ACETAMINOPHEN-BUTALBITAL  MG TABS Take by mouth every 6 hours as needed      doxepin (SINEQUAN) 25 MG capsule Take 25 mg by mouth      fluocinolone (DERMOTIC) 0.01 % OIL oil Place 5 drops in ear(s) 2 times daily      glipiZIDE (GLUCOTROL XL) 10 MG extended release tablet TAKE 2 TABLETS EVERY DAY      hydroCHLOROthiazide (HYDRODIURIL) 25 MG tablet Take 25 mg by mouth daily      pioglitazone (ACTOS) 15 MG tablet Take 15 mg by mouth daily      pregabalin (LYRICA) 50 MG capsule TAKE 1 CAPSULE BY MOUTH THREE TIMES DAILY FOR DIABETIC COMPLICATION CAUSING INJURY TO SOME BODY NERVES      pregabalin (LYRICA) 75 MG capsule Take 75 mg by mouth 3 times daily. Sodium Chloride 3 % AERS as needed      SODIUM FLUORIDE, DENTAL GEL, 1.1 % GEL Place 1 each onto teeth as needed      spironolactone (ALDACTONE) 25 MG tablet Take 25 mg by mouth daily      tiZANidine (ZANAFLEX) 2 MG tablet Take 2 mg by mouth 3 times daily as needed       No current facility-administered medications for this visit.         Allergies   Allergen Reactions    Hydrocodone-Acetaminophen Itching    Morphine Nausea Only    Tramadol Diarrhea, Itching and Nausea Only     Patient Active Problem List    Diagnosis Date Noted    Opioid use, unspecified with unspecified opioid-induced disorder ([facility] 07/42/0844    Diastolic CHF, chronic ([facility] 08/01/2019    Stage 3 chronic kidney disease ([facility] 03/29/2019    Precordial pain 03/20/2019    Left groin pain 10/26/2018    Recurrent vertigo 04/16/2018    Type 2 diabetes mellitus with nephropathy ([facility] 01/23/2018    Bilateral carotid artery disease ([facility] 11/07/2017    Sick sinus syndrome ([facility] 02/21/2017    Encounter for long-term (current) use of medications 04/22/2016    Hypertension      Normal LHC in [facility] 2002 and 2003        Carotid artery stenosis without cerebral infarction 10/06/2015     US 2/14 Mild dz bilaterally        Aortic valve regurgitation 10/06/2015     Echo 8/15 normal EF, mild AI and MR        Impotence of organic origin 06/30/2015    Hyperlipemia 06/30/2015    Atrial fibrillation ([facility] 06/30/2015    Gout, unspecified 06/30/2015    Diabetic neuropathy ([facility] 06/30/2015    Tietze's disease     Tension headache     GERD (gastroesophageal reflux disease)      meds control         Diabetes ([facility]      type diabetic - insulin dependent-range in 1 month:         Chronic pain      low back pain- not relieved by pain meds          Past Surgical History:   Procedure Laterality Date    ANTERIOR CRUCIATE LIGAMENT REPAIR Right     APPENDECTOMY      BREAST SURGERY      CHEST SURGERY      GI      HAMMER TOE SURGERY Left 03/2017    HEENT      HERNIA REPAIR  6/2010    inguinal    NEUROLOGICAL SURGERY      ORTHOPEDIC SURGERY      r rotator cuff    ORTHOPEDIC SURGERY      l knee scope    ORTHOPEDIC SURGERY      left elbow    OTHER SURGICAL HISTORY      groin surgery    OTHER SURGICAL HISTORY      PACEMAKER      NC ABDOMEN SURGERY PROC UNLISTED      NC CARDIAC SURG PROCEDURE UNLIST      PROSTATECTOMY      SHOULDER ARTHROSCOPY Right     UROLOGICAL SURGERY      VASCULAR SURGERY      VASCULAR SURGERY       Family History   Problem Relation Age of Onset    Stomach Cancer Father     Stroke Mother     Other Brother 25        polio meningitis @ age 25 confined to wheelchair     Other Brother         aneurysm    Diabetes Brother      Social History     Tobacco Use    Smoking status: Never    Smokeless tobacco: Never   Substance Use Topics    Alcohol use: No         ROS:    No obvious pertinent positives on review of systems except for what was outlined in the HPI today.       PHYSICAL EXAM:     BP 98/60   Pulse 88   Ht 6' 2\" (1.88 m)   Wt 213 lb 12.8 oz (97 kg)   BMI 27.45 kg/m²    General/Constitutional:   Alert and oriented x 3, no acute distress  HEENT:   normocephalic, atraumatic, moist mucous membranes  Neck:   No JVD or carotid bruits bilaterally  Cardiovascular:   regular rate and rhythm, no murmur/rub/gallop appreciated  Pulmonary:   clear to auscultation bilaterally, no respiratory distress  Abdomen:   soft, non-tender, non-distended  Ext:   No sig LE edema bilaterally  Skin:  warm and dry, no obvious rashes seen  Neuro:   no obvious sensory or motor deficits  Psychiatric:   normal mood and affect      Lab Results   Component Value Date/Time     12/17/2021 08:24 AM    K 4.7 12/17/2021 08:24 AM     12/17/2021 08:24 AM    CO2 21 12/17/2021 08:24 AM    BUN 15 12/17/2021 08:24 AM    CREATININE 1.68 12/17/2021 08:24 AM    GLUCOSE 153 12/17/2021 08:24 AM    CALCIUM 9.5 12/17/2021 08:24 AM        Lab Results   Component Value Date    WBC 7.6 12/17/2021    HGB 14.6 12/17/2021    HCT 44.5 12/17/2021    MCV 91 12/17/2021     12/17/2021       Lab Results   Component Value Date    TSH 4.020 03/15/2021       Lab Results   Component Value Date    LABA1C 7.2 (H) 11/19/2021     Lab Results   Component Value Date     11/19/2021       Lab Results   Component Value Date    CHOL 124 03/15/2021    CHOL 110 11/14/2019     Lab Results   Component Value Date    TRIG 110 03/15/2021    TRIG 61 11/14/2019     Lab Results   Component Value Date    HDL 44 03/15/2021    HDL 45 11/14/2019     Lab Results   Component Value Date    LDLCALC 60 03/15/2021    LDLCALC 53 11/14/2019     Lab Results   Component Value Date    LABVLDL 12 11/14/2019    VLDL 20 03/15/2021     No results found for: CHOLHDLRATIO        I have Independently reviewed prior care notes, any ER records available, cardiac testing, labs and results with the patient and before seeing the patient today. Also independently reviewed outside records when available. ASSESSMENT:    Hair Mcdonnell was seen today for chest pain. Diagnoses and all orders for this visit:    Diastolic CHF, chronic (HCC)    Nonrheumatic aortic valve insufficiency    Longstanding persistent atrial fibrillation ([facility]        PLAN:      1. CDHF: remain on meds. Lasix PRN. Follow Cr and K. Off aldactone with breast pain. 2.  HTN:  on norvasc, better now, follow. 3.  PAF/SSS:  Off the BB for now. Follow for SSS, no PM needed now. Doing well on NOAC, follow. Reduce NOAC in future if Cr remains > 1.5. I have reviewed their anticoagulation treatment, instructed patient to call with any bleeding issues such as melana or other. The patient will call with any issues related to their treatment. All questions were answered with the patient voicing understanding. 4. CP/NIELSEN/SOB:  Patient presents for followup of recent cardiac stress testing. The stress test showed normal LV function with no evidence of ischemia. We discussed the reassuring results of the stress test.  We also discused the importance of ongoing risk factor modification for the prevention of future cardiovascular events. A healthy lifestyle was discussed. This would include heart-healthy diet, regular aerobic exercises, maintenance of desirable body weight and avoidance of tobacco products  Patient is encouraged to contact the office with any recurrent symptoms or concerns as reviewed today. All questions were answered with the patient voicing understanding.        5. . Carotid Dz: Follow in the future. 6. CKD: follow labs. Patient has been instructed and agrees to call our office with any issues or other concerns related to their cardiac condition(s) and/or complaint(s). Return in about 6 months (around 3/12/2023).        LUCIANO BRUNO,   9/12/2022

## 2022-10-11 ENCOUNTER — OFFICE VISIT (OUTPATIENT)
Dept: CARDIOLOGY CLINIC | Age: 82
End: 2022-10-11
Payer: MEDICARE

## 2022-10-11 VITALS
HEIGHT: 74 IN | DIASTOLIC BLOOD PRESSURE: 76 MMHG | HEART RATE: 72 BPM | WEIGHT: 214.8 LBS | SYSTOLIC BLOOD PRESSURE: 140 MMHG | BODY MASS INDEX: 27.57 KG/M2

## 2022-10-11 DIAGNOSIS — I35.1 NONRHEUMATIC AORTIC VALVE INSUFFICIENCY: ICD-10-CM

## 2022-10-11 DIAGNOSIS — I50.32 DIASTOLIC CHF, CHRONIC (HCC): ICD-10-CM

## 2022-10-11 DIAGNOSIS — R07.2 PRECORDIAL PAIN: ICD-10-CM

## 2022-10-11 DIAGNOSIS — R07.89 CHEST DISCOMFORT: Primary | ICD-10-CM

## 2022-10-11 DIAGNOSIS — I49.5 SICK SINUS SYNDROME (HCC): ICD-10-CM

## 2022-10-11 DIAGNOSIS — I48.11 LONGSTANDING PERSISTENT ATRIAL FIBRILLATION (HCC): ICD-10-CM

## 2022-10-11 DIAGNOSIS — N18.31 STAGE 3A CHRONIC KIDNEY DISEASE (HCC): ICD-10-CM

## 2022-10-11 DIAGNOSIS — I10 PRIMARY HYPERTENSION: ICD-10-CM

## 2022-10-11 PROCEDURE — G8417 CALC BMI ABV UP PARAM F/U: HCPCS | Performed by: INTERNAL MEDICINE

## 2022-10-11 PROCEDURE — 93000 ELECTROCARDIOGRAM COMPLETE: CPT | Performed by: INTERNAL MEDICINE

## 2022-10-11 PROCEDURE — G8427 DOCREV CUR MEDS BY ELIG CLIN: HCPCS | Performed by: INTERNAL MEDICINE

## 2022-10-11 PROCEDURE — 1036F TOBACCO NON-USER: CPT | Performed by: INTERNAL MEDICINE

## 2022-10-11 PROCEDURE — 1123F ACP DISCUSS/DSCN MKR DOCD: CPT | Performed by: INTERNAL MEDICINE

## 2022-10-11 PROCEDURE — G8484 FLU IMMUNIZE NO ADMIN: HCPCS | Performed by: INTERNAL MEDICINE

## 2022-10-11 PROCEDURE — 99214 OFFICE O/P EST MOD 30 MIN: CPT | Performed by: INTERNAL MEDICINE

## 2022-10-11 NOTE — PROGRESS NOTES
[address], [address] [facility], [address] [place] [address]  PHONE: [phone]     10/11/22    NAME:  Svetlana Moreno  : 1940  MRN: [id number]       SUBJECTIVE:   Svetlana Moreno is a 79 y.o. male seen for a follow up visit regarding the following:     Chief Complaint   Patient presents with    Chest Pain       HPI: Here for eval of Afib. Carotid US 2018: mild   LHC 7/3/19: mild CAD   Echo 2019: normal EF, mild AI and MR  NST 2022: LV perfusion is normal.     Walking, feeling well walking. No CP with walking. Some night time CP, no SOB. No new NIELSEN. Off aldactone with breast swelling. Patient denies recent history of orthopnea, PND, excessive dizziness and/or syncope. Doing well on anticoagulation treatment as reviewed today, no bleeding issues or excessive bruising noted. Past Medical History, Past Surgical History, Family history, Social History, and Medications were all reviewed with the patient today and updated as necessary. Current Outpatient Medications   Medication Sig Dispense Refill    ciprofloxacin-dexamethasone (CIPRODEX) 0.3-0.1 % otic suspension Place 4 drops in ear(s) 2 times daily      esomeprazole (NEXIUM) 40 MG delayed release capsule       gabapentin (NEURONTIN) 100 MG capsule TAKE 2 CAPSULES BY MOUTH NIGHTLY      hydrocortisone 2.5 % cream Apply topically 2 times daily as needed      losartan (COZAAR) 25 MG tablet Take 25 mg by mouth daily      acetaminophen (TYLENOL) 500 MG tablet Take 1,000 mg by mouth 2 times daily      amLODIPine (NORVASC) 10 MG tablet Take 10 mg by mouth daily      apixaban (ELIQUIS) 5 MG TABS tablet Take 5 mg by mouth in the morning.       atorvastatin (LIPITOR) 40 MG tablet TAKE 1 TABLET EVERY EVENING      budesonide-formoterol (SYMBICORT) 160-4.5 MCG/ACT AERO Inhale 2 puffs into the lungs 2 times daily      ACETAMINOPHEN-BUTALBITAL  MG TABS Take by mouth every 6 hours as needed      vitamin D3 (CHOLECALCIFEROL) 125 MCG (5000 UT) TABS tablet Take by mouth daily      clotrimazole-betamethasone (LOTRISONE) 1-0.05 % cream Apply topically 2 times daily      colchicine (MITIGARE) 0.6 MG capsule For gout flare take 2 capsules and repeat with 1 capsule in 2 hours.       cyanocobalamin 1000 MCG tablet Take 1,000 mcg by mouth daily      diclofenac sodium (VOLTAREN) 1 % GEL Diclofenac 1 % topical gel      doxepin (SINEQUAN) 25 MG capsule Take 25 mg by mouth      fluocinolone acetonide (SYNALAR) 0.01 % external solution Apply topically 2 times daily      fluocinolone (DERMOTIC) 0.01 % OIL oil Place 5 drops in ear(s) 2 times daily      furosemide (LASIX) 40 MG tablet TAKE 1 TABLET EVERY DAY      glipiZIDE (GLUCOTROL XL) 10 MG extended release tablet TAKE 2 TABLETS EVERY DAY      hydroCHLOROthiazide (HYDRODIURIL) 25 MG tablet Take 25 mg by mouth daily      Hyoscyamine Sulfate SL 0.125 MG SUBL TAKE 1 TABLET UNDER THE TONGUE EVERY DAY AS NEEDED FOR STOMACH      Insulin Glargine, 1 Unit Dial, (TOUJEO SOLOSTAR) 300 UNIT/ML SOPN Inject 22 Units into the skin      insulin regular (HUMULIN R;NOVOLIN R) 100 UNIT/ML injection Inject 6 Units into the skin 3 times daily (before meals)      levocetirizine (XYZAL) 5 MG tablet Take 5 mg by mouth daily      meclizine (ANTIVERT) 25 MG tablet TAKE 1 TABLET THREE TIMES DAILY AS NEEDED      montelukast (SINGULAIR) 10 MG tablet Take 10 mg by mouth daily as needed      omeprazole (PRILOSEC) 20 MG delayed release capsule TAKE 1 CAPSULE TWICE DAILY      pioglitazone (ACTOS) 15 MG tablet Take 15 mg by mouth daily      Sodium Chloride 3 % AERS as needed      SITagliptin (JANUVIA) 100 MG tablet TAKE 1 TABLET EVERY DAY      SODIUM FLUORIDE, DENTAL GEL, 1.1 % GEL Place 1 each onto teeth as needed      tamsulosin (FLOMAX) 0.4 MG capsule Take 0.4 mg by mouth      tiZANidine (ZANAFLEX) 2 MG tablet Take 2 mg by mouth 3 times daily as needed      triamcinolone (KENALOG) 0.1 % cream Apply topically 2 times daily      triamcinolone (NASACORT) 55 MCG/ACT nasal inhaler 2 sprays daily as needed      pregabalin (LYRICA) 50 MG capsule TAKE 1 CAPSULE BY MOUTH THREE TIMES DAILY FOR DIABETIC COMPLICATION CAUSING INJURY TO SOME BODY NERVES      pregabalin (LYRICA) 75 MG capsule Take 75 mg by mouth 3 times daily. spironolactone (ALDACTONE) 25 MG tablet Take 25 mg by mouth daily       No current facility-administered medications for this visit.         Allergies   Allergen Reactions    Hydrocodone-Acetaminophen Itching    Morphine Nausea Only    Tramadol Diarrhea, Itching and Nausea Only     Patient Active Problem List    Diagnosis Date Noted    Opioid use, unspecified with unspecified opioid-induced disorder ([facility] 06/86/5789    Diastolic CHF, chronic ([facility] 08/01/2019    Stage 3 chronic kidney disease ([facility] 03/29/2019    Precordial pain 03/20/2019    Left groin pain 10/26/2018    Recurrent vertigo 04/16/2018    Type 2 diabetes mellitus with nephropathy ([facility] 01/23/2018    Bilateral carotid artery disease ([facility] 11/07/2017    Sick sinus syndrome ([facility] 02/21/2017    Encounter for long-term (current) use of medications 04/22/2016    Hypertension      Normal [facility] 2002 and 2003        Carotid artery stenosis without cerebral infarction 10/06/2015     US 2/14 Mild dz bilaterally        Aortic valve regurgitation 10/06/2015     Echo 8/15 normal EF, mild AI and MR        Impotence of organic origin 06/30/2015    Hyperlipemia 06/30/2015    Atrial fibrillation ([address] 06/30/2015    Gout, unspecified 06/30/2015    Diabetic neuropathy ([facility] 06/30/2015    Tietze's disease     Tension headache     GERD (gastroesophageal reflux disease)      meds control         Diabetes ([facility]      type diabetic - insulin dependent-range in 1 month:         Chronic pain      low back pain- not relieved by pain meds          Past Surgical History:   Procedure Laterality Date    ANTERIOR CRUCIATE LIGAMENT REPAIR Right     APPENDECTOMY      BREAST SURGERY      CHEST SURGERY      GI HAMMER TOE SURGERY Left 03/2017    HEENT      HERNIA REPAIR  6/2010    inguinal    NEUROLOGICAL SURGERY      ORTHOPEDIC SURGERY      r rotator cuff    ORTHOPEDIC SURGERY      l knee scope    ORTHOPEDIC SURGERY      left elbow    OTHER SURGICAL HISTORY      groin surgery    OTHER SURGICAL HISTORY      PACEMAKER      PA ABDOMEN SURGERY PROC UNLISTED      PA CARDIAC SURG PROCEDURE UNLIST      PROSTATECTOMY      SHOULDER ARTHROSCOPY Right     UROLOGICAL SURGERY      VASCULAR SURGERY      VASCULAR SURGERY       Family History   Problem Relation Age of Onset    Stomach Cancer Father     Stroke Mother     Other Brother 25        polio meningitis @ age 25 confined to wheelchair     Other Brother         aneurysm    Diabetes Brother      Social History     Tobacco Use    Smoking status: Never    Smokeless tobacco: Never   Substance Use Topics    Alcohol use: No         ROS:    No obvious pertinent positives on review of systems except for what was outlined in the HPI today. PHYSICAL EXAM:     BP (!) 140/76   Pulse 72   Ht 6' 2\" (1.88 m)   Wt 214 lb 12.8 oz (97.4 kg)   BMI 27.58 kg/m²    General/Constitutional:   Alert and oriented x 3, no acute distress  HEENT:   normocephalic, atraumatic, moist mucous membranes  Neck:   No JVD or carotid bruits bilaterally  Cardiovascular:   irreg, no murmur/rub/gallop appreciated  Pulmonary:   clear to auscultation bilaterally, no respiratory distress  Abdomen:   soft, non-tender, non-distended  Ext:   No sig LE edema bilaterally  Skin:  warm and dry, no obvious rashes seen  Neuro:   no obvious sensory or motor deficits  Psychiatric:   normal mood and affect    EKG Today and independently reviewed by me: Afib, normal intervals and non-specific ST/T wave changes.         Lab Results   Component Value Date/Time     12/17/2021 08:24 AM    K 4.7 12/17/2021 08:24 AM     12/17/2021 08:24 AM    CO2 21 12/17/2021 08:24 AM    BUN 15 12/17/2021 08:24 AM    CREATININE 1.68 12/17/2021 08:24 AM    GLUCOSE 153 12/17/2021 08:24 AM    CALCIUM 9.5 12/17/2021 08:24 AM        Lab Results   Component Value Date    WBC 7.6 12/17/2021    HGB 14.6 12/17/2021    HCT 44.5 12/17/2021    MCV 91 12/17/2021     12/17/2021       Lab Results   Component Value Date    TSH 4.020 03/15/2021       Lab Results   Component Value Date    LABA1C 7.2 (H) 11/19/2021     Lab Results   Component Value Date     11/19/2021       Lab Results   Component Value Date    CHOL 124 03/15/2021    CHOL 110 11/14/2019     Lab Results   Component Value Date    TRIG 110 03/15/2021    TRIG 61 11/14/2019     Lab Results   Component Value Date    HDL 44 03/15/2021    HDL 45 11/14/2019     Lab Results   Component Value Date    LDLCALC 60 03/15/2021    LDLCALC 53 11/14/2019     Lab Results   Component Value Date    LABVLDL 12 11/14/2019    VLDL 20 03/15/2021     No results found for: CHOLHDLRATIO        I have Independently reviewed prior care notes, any ER records available, cardiac testing, labs and results with the patient and before seeing the patient today. Also independently reviewed outside records when available. ASSESSMENT:    Yuko Gurrola was seen today for chest pain. Diagnoses and all orders for this visit:    Chest discomfort  -     EKG 12 Lead    Longstanding persistent atrial fibrillation (HCC)    Diastolic CHF, chronic (HCC)    Primary hypertension    Sick sinus syndrome (HCC)    Nonrheumatic aortic valve insufficiency    Precordial pain    Stage 3a chronic kidney disease ([facility]        PLAN:      1. CDHF: remain on meds. Lasix PRN. Follow Cr and K. Off aldactone with breast pain. 2.  HTN:  on norvasc, better now, follow. 3.  PAF/SSS:  Off the BB for now. Follow for SSS, no PM needed now. Doing well on NOAC, follow. Reduce NOAC in future if Cr remains > 1.5.         I have reviewed their anticoagulation treatment, instructed patient to call with any bleeding issues such as melana or other. The patient will call with any issues related to their treatment. All questions were answered with the patient voicing understanding. 4. CP/NIELSEN/SOB:  CP atypical, LHC for more typical angina as stressed. To ER for more sx.        5. . Carotid Dz: Follow in the future. 6. CKD: follow labs. Patient has been instructed and agrees to call our office with any issues or other concerns related to their cardiac condition(s) and/or complaint(s). Return in about 6 months (around 4/11/2023).        LUCIANO BRUNO DO  10/11/2022

## 2022-12-12 ENCOUNTER — OFFICE VISIT (OUTPATIENT)
Dept: UROLOGY | Age: 82
End: 2022-12-12
Payer: MEDICARE

## 2022-12-12 DIAGNOSIS — N52.9 ERECTILE DYSFUNCTION, UNSPECIFIED ERECTILE DYSFUNCTION TYPE: ICD-10-CM

## 2022-12-12 DIAGNOSIS — N48.1 BALANITIS: ICD-10-CM

## 2022-12-12 DIAGNOSIS — N40.1 BPH WITH OBSTRUCTION/LOWER URINARY TRACT SYMPTOMS: ICD-10-CM

## 2022-12-12 DIAGNOSIS — B37.2 YEAST DERMATITIS: ICD-10-CM

## 2022-12-12 DIAGNOSIS — N47.1 PHIMOSIS: ICD-10-CM

## 2022-12-12 DIAGNOSIS — R31.0 GROSS HEMATURIA: Primary | ICD-10-CM

## 2022-12-12 DIAGNOSIS — N13.8 BPH WITH OBSTRUCTION/LOWER URINARY TRACT SYMPTOMS: ICD-10-CM

## 2022-12-12 LAB
BILIRUBIN, URINE, POC: NEGATIVE
BLOOD URINE, POC: ABNORMAL
GLUCOSE URINE, POC: 500
KETONES, URINE, POC: ABNORMAL
LEUKOCYTE ESTERASE, URINE, POC: NEGATIVE
NITRITE, URINE, POC: ABNORMAL
PH, URINE, POC: 5.5 (ref 4.6–8)
PROTEIN,URINE, POC: NEGATIVE
SPECIFIC GRAVITY, URINE, POC: 1.02 (ref 1–1.03)
URINALYSIS CLARITY, POC: CLEAR
URINALYSIS COLOR, POC: YELLOW
UROBILINOGEN, POC: 0.2

## 2022-12-12 PROCEDURE — 1123F ACP DISCUSS/DSCN MKR DOCD: CPT | Performed by: NURSE PRACTITIONER

## 2022-12-12 PROCEDURE — G8427 DOCREV CUR MEDS BY ELIG CLIN: HCPCS | Performed by: NURSE PRACTITIONER

## 2022-12-12 PROCEDURE — 81003 URINALYSIS AUTO W/O SCOPE: CPT | Performed by: NURSE PRACTITIONER

## 2022-12-12 PROCEDURE — G8484 FLU IMMUNIZE NO ADMIN: HCPCS | Performed by: NURSE PRACTITIONER

## 2022-12-12 PROCEDURE — 99214 OFFICE O/P EST MOD 30 MIN: CPT | Performed by: NURSE PRACTITIONER

## 2022-12-12 PROCEDURE — G8417 CALC BMI ABV UP PARAM F/U: HCPCS | Performed by: NURSE PRACTITIONER

## 2022-12-12 PROCEDURE — 1036F TOBACCO NON-USER: CPT | Performed by: NURSE PRACTITIONER

## 2022-12-12 RX ORDER — CLOTRIMAZOLE AND BETAMETHASONE DIPROPIONATE 10; .64 MG/G; MG/G
CREAM TOPICAL 2 TIMES DAILY
Qty: 45 G | Refills: 3 | Status: SHIPPED | OUTPATIENT
Start: 2022-12-12

## 2022-12-12 RX ORDER — NYSTATIN 100000 [USP'U]/G
POWDER TOPICAL 2 TIMES DAILY
Qty: 60 G | Refills: 3 | Status: SHIPPED | OUTPATIENT
Start: 2022-12-12

## 2022-12-12 RX ORDER — TAMSULOSIN HYDROCHLORIDE 0.4 MG/1
0.4 CAPSULE ORAL DAILY
Qty: 90 CAPSULE | Refills: 3 | Status: SHIPPED | OUTPATIENT
Start: 2022-12-12

## 2022-12-12 RX ORDER — TRAMADOL HYDROCHLORIDE 50 MG/1
50 TABLET ORAL EVERY 8 HOURS PRN
COMMUNITY
Start: 2022-04-27 | End: 2022-12-28

## 2022-12-12 NOTE — PROGRESS NOTES
[facility]  [address] [facility] [address], [address] [facility]  [phone]          Lugenia Care  : 1940    Chief Complaint   Patient presents with    Follow-up    Hematuria          HPI     Lugenia Care is a 79 y.o. male diabetic, who presented 19 in regards to 2.5 weeks of itching pain under the foreskin Of note, he underwent L partial orchiectomy as a young child due to mumps. He denies any prior similar epsiodes. He denies fever/chill. He was noted to have a mild phimosis with cracking/posthitis. He has since tried steroid creams/antifungal creams/combo of the 2 creams. He finally tried triple antibiotic cream and it has improved cracking/pain. He still has the mild phimosis. Foreskin can be retracted with a little effort. A+D diaper cream finally did the trick and greatly improved phimotic band cracking. He has used the A+D again recently and it does help. also uses Lotrisone cream PRN. Has occ yeast rash in groin. No relief w OTC tx. Seen back in 21 for gross hematuria and R testicular pain. Prev seen at urgent care. Dx w UTI. Started on Keflex. Hematuria cleared. Pain resolved. He had unrelated CT a/p w contrast (21) which showed mild anterior bladder wall thickening (?cystitis). No other urological findings noted. Images not available for my review. Had recurrence of gross hematuria 21. This was painless. Had CT a/p wo contrast 21 d/t ongoing sx. This showed NO etiology for his sx. Cystoscopy in  revealed no pathology. He reports no further gross hematuria. He is taking tamsulosin and voiding is at baseline. Mostly bothered by nocturia. PVR: 0 cc , 98 cc      PSA:  0.3 (followed by PCP)    Has ED. Previously on Viagra 100 mg. He is accompanied by his wife today.       Past Medical History:   Diagnosis Date    Acetabular labrum tear     Allergy, unspecified not elsewhere classified     Anal or rectal pain     Ankle pain     Aortic valve regurgitation 10/6/2015    Atrial fibrillation ([facility] [address] [address] 6/30/2015    Balanitis xerotica obliterans     Balanoposthitis     Carotid artery stenosis without cerebral infarction 10/6/2015    Cellulitis and abscess of upper arm and forearm     Cervicalgia     Chronic pain     low back pain- not relieved by pain meds    Chronic prostatitis     Degeneration of cervical intervertebral disc     Dermatitis     Diabetes ([facility] [address] diagnosed approx 2008    type diabetic - insulin dependent-range in 1 month:     Diabetic neuropathy ([facility] [address] 6/30/2015    Edema     Generalized osteoarthrosis, unspecified site     GERD (gastroesophageal reflux disease)     meds control     Gout, unspecified 6/30/2015    Headache     Hyperlipemia 6/30/2015    Hypertension     Hypertrophy of prostate without urinary obstruction and other lower urinary tract symptoms (LUTS)     Impotence of organic origin 6/30/2015    Inguinal hernia without mention of obstruction or gangrene, bilateral, (not specified as recurrent)     Metatarsalgia     Migraine, unspecified, without mention of intractable migraine without mention of status migrainosus     Molluscum contagiosum     Muscle tear     Neuralgia, neuritis, and radiculitis, unspecified     Olecranon bursitis     Other and unspecified hyperlipidemia     Other chronic sinusitis     Other syndromes affecting cervical region     Phlebitis and thrombophlebitis of unspecified site     Rash and other nonspecific skin eruption     Rotator cuff (capsule) sprain     Tension headache     Testalgia     Tietze's disease     Tinea pedis     Unspecified sleep apnea     does not use c pap- has lost 14 lbs     Past Surgical History:   Procedure Laterality Date    ANTERIOR CRUCIATE LIGAMENT REPAIR Right     APPENDECTOMY      BREAST SURGERY      CHEST SURGERY      GI      HAMMER TOE SURGERY Left 03/2017    HEENT      HERNIA REPAIR  6/2010    inguinal    NEUROLOGICAL SURGERY ORTHOPEDIC SURGERY      r rotator cuff    ORTHOPEDIC SURGERY      l knee scope    ORTHOPEDIC SURGERY      left elbow    OTHER SURGICAL HISTORY      groin surgery    OTHER SURGICAL HISTORY      PACEMAKER      CA ABDOMEN SURGERY PROC UNLISTED      CA CARDIAC SURG PROCEDURE UNLIST      PROSTATECTOMY      SHOULDER ARTHROSCOPY Right     UROLOGICAL SURGERY      VASCULAR SURGERY      VASCULAR SURGERY       Current Outpatient Medications   Medication Sig Dispense Refill    traMADol (ULTRAM) 50 MG tablet Take 50 mg by mouth every 8 hours as needed. tamsulosin (FLOMAX) 0.4 MG capsule Take 1 capsule by mouth daily 90 capsule 3    clotrimazole-betamethasone (LOTRISONE) 1-0.05 % cream Apply topically 2 times daily 45 g 3    nystatin (MYCOSTATIN) 779499 UNIT/GM powder Apply topically 2 times daily Apply 3 times daily. 60 g 3    ciprofloxacin-dexamethasone (CIPRODEX) 0.3-0.1 % otic suspension Place 4 drops in ear(s) 2 times daily      esomeprazole (NEXIUM) 40 MG delayed release capsule       gabapentin (NEURONTIN) 100 MG capsule TAKE 2 CAPSULES BY MOUTH NIGHTLY      hydrocortisone 2.5 % cream Apply topically 2 times daily as needed      losartan (COZAAR) 25 MG tablet Take 25 mg by mouth daily      acetaminophen (TYLENOL) 500 MG tablet Take 1,000 mg by mouth 2 times daily      amLODIPine (NORVASC) 10 MG tablet Take 10 mg by mouth daily      atorvastatin (LIPITOR) 40 MG tablet TAKE 1 TABLET EVERY EVENING      budesonide-formoterol (SYMBICORT) 160-4.5 MCG/ACT AERO Inhale 2 puffs into the lungs 2 times daily      ACETAMINOPHEN-BUTALBITAL  MG TABS Take by mouth every 6 hours as needed      vitamin D3 (CHOLECALCIFEROL) 125 MCG (5000 UT) TABS tablet Take by mouth daily      colchicine (MITIGARE) 0.6 MG capsule For gout flare take 2 capsules and repeat with 1 capsule in 2 hours.       cyanocobalamin 1000 MCG tablet Take 1,000 mcg by mouth daily      diclofenac sodium (VOLTAREN) 1 % GEL Diclofenac 1 % topical gel      doxepin (SINEQUAN) 25 MG capsule Take 25 mg by mouth      fluocinolone acetonide (SYNALAR) 0.01 % external solution Apply topically 2 times daily      fluocinolone (DERMOTIC) 0.01 % OIL oil Place 5 drops in ear(s) 2 times daily      furosemide (LASIX) 40 MG tablet TAKE 1 TABLET EVERY DAY      glipiZIDE (GLUCOTROL XL) 10 MG extended release tablet TAKE 2 TABLETS EVERY DAY      hydroCHLOROthiazide (HYDRODIURIL) 25 MG tablet Take 25 mg by mouth daily      Hyoscyamine Sulfate SL 0.125 MG SUBL TAKE 1 TABLET UNDER THE TONGUE EVERY DAY AS NEEDED FOR STOMACH      Insulin Glargine, 1 Unit Dial, (TOUJEO SOLOSTAR) 300 UNIT/ML SOPN Inject 22 Units into the skin      insulin regular (HUMULIN R;NOVOLIN R) 100 UNIT/ML injection Inject 6 Units into the skin 3 times daily (before meals)      levocetirizine (XYZAL) 5 MG tablet Take 5 mg by mouth daily      meclizine (ANTIVERT) 25 MG tablet TAKE 1 TABLET THREE TIMES DAILY AS NEEDED      montelukast (SINGULAIR) 10 MG tablet Take 10 mg by mouth daily as needed      omeprazole (PRILOSEC) 20 MG delayed release capsule TAKE 1 CAPSULE TWICE DAILY      pioglitazone (ACTOS) 15 MG tablet Take 15 mg by mouth daily      Sodium Chloride 3 % AERS as needed      SITagliptin (JANUVIA) 100 MG tablet TAKE 1 TABLET EVERY DAY      SODIUM FLUORIDE, DENTAL GEL, 1.1 % GEL Place 1 each onto teeth as needed      triamcinolone (KENALOG) 0.1 % cream Apply topically 2 times daily      triamcinolone (NASACORT) 55 MCG/ACT nasal inhaler 2 sprays daily as needed      apixaban (ELIQUIS) 5 MG TABS tablet Take 5 mg by mouth in the morning. No current facility-administered medications for this visit.      Allergies   Allergen Reactions    Hydrocodone-Acetaminophen Itching    Morphine Nausea Only    Tramadol Diarrhea, Itching and Nausea Only     Social History     Socioeconomic History    Marital status:      Spouse name: Not on file    Number of children: Not on file    Years of education: Not on file    Highest education level: Not on file   Occupational History    Not on file   Tobacco Use    Smoking status: Never    Smokeless tobacco: Never   Substance and Sexual Activity    Alcohol use: No    Drug use: No    Sexual activity: Not on file   Other Topics Concern    Not on file   Social History Narrative    Not on file     Social Determinants of Health     Financial Resource Strain: Not on file   Food Insecurity: Not on file   Transportation Needs: Not on file   Physical Activity: Not on file   Stress: Not on file   Social Connections: Not on file   Intimate Partner Violence: Not on file   Housing Stability: Not on file     Family History   Problem Relation Age of Onset    Stomach Cancer Father     Stroke Mother     Other Brother 25        polio meningitis @ age 25 confined to wheelchair     Other Brother         aneurysm    Diabetes Brother        ROS    Urinalysis  UA - Dipstick  Results for orders placed or performed in visit on 12/12/22   AMB POC URINALYSIS DIP STICK AUTO W/O MICRO   Result Value Ref Range    Color, Urine, POC yellow     Clarity, Urine, POC clear     Glucose, Urine,  (A) Negative    Bilirubin, Urine, POC Negative Negative    Ketones, Urine, POC Trace Negative    Specific Gravity, Urine, POC 1.020 1.001 - 1.035    Blood, Urine, POC small Negative    pH, Urine, POC 5.5 4.6 - 8.0    Protein, Urine, POC Negative Negative    Urobilinogen, POC 0.2     Nitrate, Urine, POC neg Negative    Leukocyte Esterase, Urine, POC Negative Negative       UA - Micro  WBC - 0  RBC - 0  Bacteria - 0  Epith - 0    PHYSICAL EXAM    General appearance - well appearing and in no distress  Mental status - alert, oriented to person, place, and time  Neck - supple, no significant adenopathy  Chest/Lung-  Quiet, even and easy respiratory effort without use of accessory muscles  Skin - normal coloration and turgor, no rashes      Assessment and Plan    ICD-10-CM    1. Gross hematuria  R31.0 AMB POC URINALYSIS DIP STICK AUTO W/O MICRO      2. Phimosis  N47.1       3. Balanitis  N48.1       4. Yeast dermatitis  B37.2       5. BPH with obstruction/lower urinary tract symptoms  N40.1     N13.8       6. Erectile dysfunction, unspecified erectile dysfunction type  N52.9            Gross hematuria- work up negative. Urine normal today. No add tx. Phimosis/balanitis- stable. Cont lotrisone cream PRN. Refill provided. Yeast rash- nystatin powder one application BID PRN provided. BPH/LUTS- cont Flomax 0.4 mg PO daily. Discussed adding nighttime OAB med (Myrbetriq vs Lord Carwin). He opts to HOLD at this time. Will try conservative management. ED- discussed tx options. He opts for now tx at this time. Follow up in 2023. Sooner if needed. Agnelina Lee, ELIECERP, APRN - CNP  Dr. Gemma Rios is supervising physician today and he approves plan of care.

## 2023-02-08 ENCOUNTER — TELEPHONE (OUTPATIENT)
Dept: CARDIOLOGY CLINIC | Age: 83
End: 2023-02-08

## 2023-02-08 NOTE — TELEPHONE ENCOUNTER
Pt is calling saying he called last week for his Eliquis  5 mg   And Kathia needs us to give them a call about the dose and needs to talk with us.   Pt  wants a nurse to call him ASAP because he is out of meds

## 2023-02-09 ENCOUNTER — TELEPHONE (OUTPATIENT)
Dept: CARDIOLOGY CLINIC | Age: 83
End: 2023-02-09

## 2023-02-09 NOTE — TELEPHONE ENCOUNTER
Spoke with pt. Informed have Eliquis 5 mg samples at the main office in [place]. No samples available in the [facility] office. Pt states will come to the main office for samples. Placed Eliquis 5 mg samples ( 3 boxes of 14 tabs each lot # [id number] with exp of ). Called [facility] and spoke with Marisela Sheehan informed pt takes Eliquis 5 mg once daily per last office note on 10-11-22. Marisela Sheehan voiced understanding and then handed call off to pharmacist.  Repeated above directions.   Pharmacist voiced understanding./wc

## 2023-02-09 NOTE — TELEPHONE ENCOUNTER
Patient is asking if he can get samples Eliquis 5mg   [facility] office))  He states that [address] needs more info and he is out of meds.

## 2023-07-18 ENCOUNTER — OFFICE VISIT (OUTPATIENT)
Age: 83
End: 2023-07-18
Payer: MEDICARE

## 2023-07-18 VITALS
BODY MASS INDEX: 27.46 KG/M2 | WEIGHT: 214 LBS | DIASTOLIC BLOOD PRESSURE: 62 MMHG | SYSTOLIC BLOOD PRESSURE: 118 MMHG | HEART RATE: 80 BPM | HEIGHT: 74 IN

## 2023-07-18 DIAGNOSIS — I50.32 DIASTOLIC CHF, CHRONIC (HCC): Primary | ICD-10-CM

## 2023-07-18 DIAGNOSIS — I65.23 BILATERAL CAROTID ARTERY STENOSIS: ICD-10-CM

## 2023-07-18 DIAGNOSIS — I10 PRIMARY HYPERTENSION: ICD-10-CM

## 2023-07-18 DIAGNOSIS — I48.11 LONGSTANDING PERSISTENT ATRIAL FIBRILLATION (HCC): ICD-10-CM

## 2023-07-18 DIAGNOSIS — I49.5 SICK SINUS SYNDROME (HCC): ICD-10-CM

## 2023-07-18 PROCEDURE — 3078F DIAST BP <80 MM HG: CPT | Performed by: INTERNAL MEDICINE

## 2023-07-18 PROCEDURE — 1036F TOBACCO NON-USER: CPT | Performed by: INTERNAL MEDICINE

## 2023-07-18 PROCEDURE — 1123F ACP DISCUSS/DSCN MKR DOCD: CPT | Performed by: INTERNAL MEDICINE

## 2023-07-18 PROCEDURE — G8417 CALC BMI ABV UP PARAM F/U: HCPCS | Performed by: INTERNAL MEDICINE

## 2023-07-18 PROCEDURE — 3074F SYST BP LT 130 MM HG: CPT | Performed by: INTERNAL MEDICINE

## 2023-07-18 PROCEDURE — G8427 DOCREV CUR MEDS BY ELIG CLIN: HCPCS | Performed by: INTERNAL MEDICINE

## 2023-07-18 PROCEDURE — 99214 OFFICE O/P EST MOD 30 MIN: CPT | Performed by: INTERNAL MEDICINE

## 2023-07-18 RX ORDER — MIRTAZAPINE 30 MG/1
30 TABLET, FILM COATED ORAL NIGHTLY
COMMUNITY
Start: 2023-06-19

## 2023-07-18 RX ORDER — DULOXETIN HYDROCHLORIDE 30 MG/1
30 CAPSULE, DELAYED RELEASE ORAL 2 TIMES DAILY
COMMUNITY
Start: 2022-12-01

## 2023-07-18 RX ORDER — ALOGLIPTIN 12.5 MG/1
12.5 TABLET, FILM COATED ORAL DAILY
COMMUNITY
Start: 2022-12-12

## 2023-07-18 RX ORDER — TADALAFIL 20 MG/1
20 TABLET ORAL DAILY PRN
Qty: 10 TABLET | Refills: 3 | Status: SHIPPED | OUTPATIENT
Start: 2023-07-18

## 2023-07-18 NOTE — PROGRESS NOTES
[address] [facility], [address]  PHONE: [phone]     23    NAME:  Bassam Cruz  : 1940  MRN: [id number]       SUBJECTIVE:   Bassam Cruz is a 79 y.o. male seen for a follow up visit regarding the following:     Chief Complaint   Patient presents with    Congestive Heart Failure    Atrial Fibrillation       HPI: Here for eval of Afib. Carotid US 2018: mild   LHC 7/3/19: mild CAD   Echo 2019: normal EF, mild AI and MR  NST 2022: LV perfusion is normal.     More back issues, getting PT. Walking, feeling well walking. No CP with walking. Some night time CP, no SOB. No new NIELSEN. NO edema, palp. Off aldactone with breast swelling. Patient denies recent history of orthopnea, PND, excessive dizziness and/or syncope. Doing well on anticoagulation treatment as reviewed today, no bleeding issues or excessive bruising noted. Past Medical History, Past Surgical History, Family history, Social History, and Medications were all reviewed with the patient today and updated as necessary. Current Outpatient Medications   Medication Sig Dispense Refill    DULoxetine (CYMBALTA) 30 MG extended release capsule Take 1 capsule by mouth 2 times daily      mirtazapine (REMERON) 30 MG tablet Take 1 tablet by mouth nightly      alogliptin (NESINA) 12.5 MG TABS tablet Take 1 tablet by mouth daily      apixaban (ELIQUIS) 5 MG TABS tablet Take 1 tablet by mouth daily 180 tablet 2    tamsulosin (FLOMAX) 0.4 MG capsule Take 1 capsule by mouth daily 90 capsule 3    clotrimazole-betamethasone (LOTRISONE) 1-0.05 % cream Apply topically 2 times daily 45 g 3    nystatin (MYCOSTATIN) 163326 UNIT/GM powder Apply topically 2 times daily Apply 3 times daily.  60 g 3    ciprofloxacin-dexamethasone (CIPRODEX) 0.3-0.1 % otic suspension Place 4 drops in ear(s) 2 times daily      esomeprazole (NEXIUM) 40 MG delayed release capsule       gabapentin (NEURONTIN) 100 MG capsule TAKE 2

## 2023-08-28 RX ORDER — FLUOCINOLONE ACETONIDE 0.11 MG/ML
OIL AURICULAR (OTIC)
Qty: 20 ML | Refills: 0 | OUTPATIENT
Start: 2023-08-28

## 2023-12-05 ENCOUNTER — TELEPHONE (OUTPATIENT)
Dept: UROLOGY | Age: 83
End: 2023-12-05

## 2023-12-05 RX ORDER — NYSTATIN 100000 [USP'U]/G
POWDER TOPICAL 2 TIMES DAILY
Qty: 60 G | Refills: 3 | Status: SHIPPED | OUTPATIENT
Start: 2023-12-05

## 2023-12-05 NOTE — TELEPHONE ENCOUNTER
Patient would like cream called in duloxetin (Cymbalta)  nystatin (MYCOSTATIN) 547019 UNIT/GM powder

## 2023-12-28 RX ORDER — APIXABAN 5 MG/1
5 TABLET, FILM COATED ORAL DAILY
Qty: 90 TABLET | Refills: 3 | Status: SHIPPED | OUTPATIENT
Start: 2023-12-28

## 2024-01-22 ENCOUNTER — OFFICE VISIT (OUTPATIENT)
Age: 84
End: 2024-01-22

## 2024-01-22 VITALS
DIASTOLIC BLOOD PRESSURE: 74 MMHG | SYSTOLIC BLOOD PRESSURE: 134 MMHG | HEART RATE: 91 BPM | BODY MASS INDEX: 25.28 KG/M2 | HEIGHT: 74 IN | WEIGHT: 197 LBS

## 2024-01-22 DIAGNOSIS — I48.0 PAROXYSMAL ATRIAL FIBRILLATION (HCC): ICD-10-CM

## 2024-01-22 DIAGNOSIS — I10 PRIMARY HYPERTENSION: ICD-10-CM

## 2024-01-22 DIAGNOSIS — I49.5 SICK SINUS SYNDROME (HCC): ICD-10-CM

## 2024-01-22 DIAGNOSIS — I50.32 DIASTOLIC CHF, CHRONIC (HCC): ICD-10-CM

## 2024-01-22 DIAGNOSIS — I48.91 ATRIAL FIBRILLATION, UNSPECIFIED TYPE (HCC): Primary | ICD-10-CM

## 2024-01-22 DIAGNOSIS — I65.23 BILATERAL CAROTID ARTERY STENOSIS: ICD-10-CM

## 2024-01-22 NOTE — PROGRESS NOTES
options of NST, LHC, other stress testing and agreed to proceed with NST in our office.  To ER for worsening angina.  Plan on definitive LHC for worsening angina.            2.  HTN:  on norvasc, better now, follow.        3.  PAF/SSS:  Off the BB for now.   Follow for SSS, no PM needed now.     Labs Humera 12/2023: Cr 2.2, K 4.5     Need to reduce NOAC to 2.5 BID      I have reviewed their anticoagulation treatment, instructed patient to call with any bleeding issues such as melana or other.  The patient will call with any issues related to their treatment.  All questions were answered with the patient voicing understanding.     4. . Carotid Dz:  Follow in the future.     5. CKD: worsening, follow.       Need to manage DM better, seeing PCP tomorrow.         Patient has been instructed and agrees to call our office with any issues or other concerns related to their cardiac condition(s) and/or complaint(s).        Return for Return After Test.       Galileo Vasquez, DO  1/22/2024

## 2024-01-29 ENCOUNTER — TELEPHONE (OUTPATIENT)
Age: 84
End: 2024-01-29

## 2024-02-02 ENCOUNTER — TELEPHONE (OUTPATIENT)
Age: 84
End: 2024-02-02

## 2024-02-02 NOTE — TELEPHONE ENCOUNTER
Rohan with [facility] needs a some clarifications on RX Eliquis. There was no signature by the doctor and there are 2 lines, also pt wants to fill the generic brand and wants to know if that is ok? Needs to make sure that the RX was issued by Dr. Vasquez, and is the medication for AFIB or DVT? Rohan also needs to know the dosage for RX. Fax number is [phone] phone number is [phone] ext.[phone]

## 2024-02-07 ENCOUNTER — TELEPHONE (OUTPATIENT)
Age: 84
End: 2024-02-07

## 2024-02-08 ENCOUNTER — TELEPHONE (OUTPATIENT)
Age: 84
End: 2024-02-08

## 2024-02-09 ENCOUNTER — TELEPHONE (OUTPATIENT)
Age: 84
End: 2024-02-09

## 2024-02-09 NOTE — TELEPHONE ENCOUNTER
----- Message from Galileo Vasquez DO sent at 2/9/2024 12:38 PM EST -----  Please call the patient.  NST was ok, nothing major or concerning.  If having more angina (worsening NIELSEN, CP, SOB), please let us know.  Will review more at their follow up appointment and get plan for the future.    ThanksPedro

## 2024-02-09 NOTE — TELEPHONE ENCOUNTER
[facility] verified if could fill generic of Eliquis advised ok to fill per Dr. Vasquez was given a verbal understanding.

## 2024-03-07 ENCOUNTER — TELEPHONE (OUTPATIENT)
Age: 84
End: 2024-03-07

## 2024-03-07 NOTE — TELEPHONE ENCOUNTER
----- Message from Galileo Vasquez DO sent at 3/6/2024  8:11 AM EST -----  Please call the patient.  ECHO was ok, nothing major or concerning.  If having more angina (worsening NIELSEN, CP, SOB), please let us know.  Will review more at follow up appointment and get plan for the future.    ThanksPedro

## 2024-04-16 ENCOUNTER — OFFICE VISIT (OUTPATIENT)
Age: 84
End: 2024-04-16
Payer: MEDICARE

## 2024-04-16 VITALS
WEIGHT: 194 LBS | BODY MASS INDEX: 24.9 KG/M2 | HEIGHT: 74 IN | DIASTOLIC BLOOD PRESSURE: 66 MMHG | HEART RATE: 98 BPM | SYSTOLIC BLOOD PRESSURE: 108 MMHG

## 2024-04-16 DIAGNOSIS — I48.0 PAROXYSMAL ATRIAL FIBRILLATION (HCC): ICD-10-CM

## 2024-04-16 DIAGNOSIS — I50.32 DIASTOLIC CHF, CHRONIC (HCC): Primary | ICD-10-CM

## 2024-04-16 DIAGNOSIS — I49.5 SICK SINUS SYNDROME (HCC): ICD-10-CM

## 2024-04-16 DIAGNOSIS — I65.23 BILATERAL CAROTID ARTERY STENOSIS: ICD-10-CM

## 2024-04-16 DIAGNOSIS — I10 PRIMARY HYPERTENSION: ICD-10-CM

## 2024-04-16 PROCEDURE — 3078F DIAST BP <80 MM HG: CPT | Performed by: INTERNAL MEDICINE

## 2024-04-16 PROCEDURE — 1123F ACP DISCUSS/DSCN MKR DOCD: CPT | Performed by: INTERNAL MEDICINE

## 2024-04-16 PROCEDURE — 1036F TOBACCO NON-USER: CPT | Performed by: INTERNAL MEDICINE

## 2024-04-16 PROCEDURE — 3074F SYST BP LT 130 MM HG: CPT | Performed by: INTERNAL MEDICINE

## 2024-04-16 PROCEDURE — G8420 CALC BMI NORM PARAMETERS: HCPCS | Performed by: INTERNAL MEDICINE

## 2024-04-16 PROCEDURE — G8427 DOCREV CUR MEDS BY ELIG CLIN: HCPCS | Performed by: INTERNAL MEDICINE

## 2024-04-16 PROCEDURE — 99214 OFFICE O/P EST MOD 30 MIN: CPT | Performed by: INTERNAL MEDICINE

## 2024-04-16 RX ORDER — ALBUTEROL SULFATE 90 UG/1
2 AEROSOL, METERED RESPIRATORY (INHALATION) EVERY 6 HOURS PRN
COMMUNITY

## 2024-04-16 RX ORDER — PREGABALIN 50 MG/1
50 CAPSULE ORAL 3 TIMES DAILY
COMMUNITY
End: 2024-04-16 | Stop reason: ALTCHOICE

## 2024-04-16 RX ORDER — TRAMADOL HYDROCHLORIDE 50 MG/1
50 TABLET ORAL EVERY 6 HOURS PRN
COMMUNITY
End: 2024-04-16

## 2024-04-16 RX ORDER — TRAMADOL HYDROCHLORIDE 50 MG/1
50 TABLET ORAL EVERY 6 HOURS PRN
COMMUNITY

## 2024-04-16 RX ORDER — PREGABALIN 50 MG/1
50 CAPSULE ORAL 3 TIMES DAILY
COMMUNITY
End: 2024-04-16

## 2024-04-16 RX ORDER — ALBUTEROL SULFATE 90 UG/1
2 AEROSOL, METERED RESPIRATORY (INHALATION) EVERY 6 HOURS PRN
COMMUNITY
End: 2024-04-16 | Stop reason: ALTCHOICE

## 2024-04-16 RX ORDER — PREGABALIN 50 MG/1
50 CAPSULE ORAL 3 TIMES DAILY
COMMUNITY

## 2024-04-16 NOTE — PROGRESS NOTES
06/30/2015    Gout, unspecified 06/30/2015    Diabetic neuropathy (HCC) 06/30/2015    Tietze's disease     Tension headache     GERD (gastroesophageal reflux disease)      meds control         Diabetes ([facility])      type diabetic - insulin dependent-range in 1 month:         Chronic pain      low back pain- not relieved by pain meds          Past Surgical History:   Procedure Laterality Date    ANTERIOR CRUCIATE LIGAMENT REPAIR Right     APPENDECTOMY      BREAST SURGERY      CHEST SURGERY      GI      HAMMER TOE SURGERY Left 03/2017    HEENT      HERNIA REPAIR  6/2010    inguinal    NEUROLOGICAL SURGERY      ORTHOPEDIC SURGERY      r rotator cuff    ORTHOPEDIC SURGERY      l knee scope    ORTHOPEDIC SURGERY      left elbow    OTHER SURGICAL HISTORY      groin surgery    OTHER SURGICAL HISTORY      PACEMAKER      WA UNLISTED PROCEDURE ABDOMEN PERITONEUM & OMENTUM      WA UNLISTED PROCEDURE CARDIAC SURGERY      PROSTATECTOMY      SHOULDER ARTHROSCOPY Right     UROLOGICAL SURGERY      VASCULAR SURGERY      VASCULAR SURGERY       Family History   Problem Relation Age of Onset    Stomach Cancer Father     Stroke Mother     Other Brother 18        polio meningitis @ age 18 confined to wheelchair     Other Brother         aneurysm    Diabetes Brother      Social History     Tobacco Use    Smoking status: Never    Smokeless tobacco: Never   Substance Use Topics    Alcohol use: No         ROS:    No obvious pertinent positives on review of systems except for what was outlined in the HPI today.      PHYSICAL EXAM:     /66   Pulse 98   Ht 1.88 m (6' 2\")   Wt 88 kg (194 lb)   BMI 24.91 kg/m²    General/Constitutional:   Alert and oriented x 3, no acute distress  HEENT:   normocephalic, atraumatic, moist mucous membranes  Neck:   No JVD or carotid bruits bilaterally  Cardiovascular:   regular rate and rhythm, no murmur/rub/gallop appreciated  Pulmonary:   clear to auscultation bilaterally, no respiratory

## 2024-07-31 ENCOUNTER — OFFICE VISIT (OUTPATIENT)
Age: 84
End: 2024-07-31
Payer: MEDICARE

## 2024-07-31 VITALS
HEART RATE: 87 BPM | BODY MASS INDEX: 22.84 KG/M2 | HEIGHT: 74 IN | DIASTOLIC BLOOD PRESSURE: 70 MMHG | SYSTOLIC BLOOD PRESSURE: 110 MMHG | WEIGHT: 178 LBS

## 2024-07-31 DIAGNOSIS — I50.32 DIASTOLIC CHF, CHRONIC (HCC): ICD-10-CM

## 2024-07-31 DIAGNOSIS — I10 PRIMARY HYPERTENSION: ICD-10-CM

## 2024-07-31 DIAGNOSIS — I48.91 ATRIAL FIBRILLATION, UNSPECIFIED TYPE (HCC): Primary | ICD-10-CM

## 2024-07-31 DIAGNOSIS — I65.23 BILATERAL CAROTID ARTERY STENOSIS: ICD-10-CM

## 2024-07-31 DIAGNOSIS — I35.1 NONRHEUMATIC AORTIC VALVE INSUFFICIENCY: ICD-10-CM

## 2024-07-31 DIAGNOSIS — I48.0 PAROXYSMAL ATRIAL FIBRILLATION (HCC): ICD-10-CM

## 2024-07-31 PROCEDURE — G8427 DOCREV CUR MEDS BY ELIG CLIN: HCPCS | Performed by: INTERNAL MEDICINE

## 2024-07-31 PROCEDURE — 99214 OFFICE O/P EST MOD 30 MIN: CPT | Performed by: INTERNAL MEDICINE

## 2024-07-31 PROCEDURE — 93000 ELECTROCARDIOGRAM COMPLETE: CPT | Performed by: INTERNAL MEDICINE

## 2024-07-31 PROCEDURE — G8420 CALC BMI NORM PARAMETERS: HCPCS | Performed by: INTERNAL MEDICINE

## 2024-07-31 PROCEDURE — 1123F ACP DISCUSS/DSCN MKR DOCD: CPT | Performed by: INTERNAL MEDICINE

## 2024-07-31 PROCEDURE — 3074F SYST BP LT 130 MM HG: CPT | Performed by: INTERNAL MEDICINE

## 2024-07-31 PROCEDURE — 3078F DIAST BP <80 MM HG: CPT | Performed by: INTERNAL MEDICINE

## 2024-07-31 PROCEDURE — 1036F TOBACCO NON-USER: CPT | Performed by: INTERNAL MEDICINE

## 2024-07-31 RX ORDER — ASPIRIN 81 MG/1
81 TABLET ORAL DAILY
COMMUNITY

## 2024-07-31 RX ORDER — CETIRIZINE HYDROCHLORIDE 10 MG/1
10 TABLET ORAL DAILY
COMMUNITY

## 2024-07-31 NOTE — PROGRESS NOTES
[address] [facility], [place] [address]  PHONE: [phone]     24    NAME:  Uzair Cobos  : 1940  MRN: [id number]       SUBJECTIVE:   Uzair Cobos is a 84 y.o. male seen for a follow up visit regarding the following:     Chief Complaint   Patient presents with    Atrial Fibrillation    Congestive Heart Failure    Shortness of Breath       HPI: Here for eval of Afib.   Carotid US 2018: mild   LHC 7/3/19: mild CAD   Echo 2019 and 3/2024: normal EF, mild AI and MR  NST 2024: The study is negative for myocardial ischemia. Findings suggest a low risk of cardiac events.      More NIELSEN now, more SOB.  Worsening, no CP.   Wife feels he is depressed, \"he does not move all day\".   Off aldactone with breast swelling.    Patient denies recent history of orthopnea, PND, excessive dizziness and/or syncope.  Doing well on anticoagulation treatment as reviewed today, no bleeding issues or excessive bruising noted.     Past Medical History, Past Surgical History, Family history, Social History, and Medications were all reviewed with the patient today and updated as necessary.     Current Outpatient Medications   Medication Sig Dispense Refill    empagliflozin (JARDIANCE) 10 MG tablet Take 1 tablet by mouth daily      aspirin 81 MG EC tablet Take 1 tablet by mouth daily      cetirizine (ZYRTEC) 10 MG tablet Take 1 tablet by mouth daily      apixaban (ELIQUIS) 2.5 MG TABS tablet Take 1 tablet by mouth 2 times daily 180 tablet 3    albuterol sulfate HFA (VENTOLIN HFA) 108 (90 Base) MCG/ACT inhaler Inhale 2 puffs into the lungs every 6 hours as needed for Wheezing      pregabalin (LYRICA) 50 MG capsule Take 1 capsule by mouth 3 times daily.      traMADol (ULTRAM) 50 MG tablet Take 1 tablet by mouth every 6 hours as needed for Pain.      nystatin (MYCOSTATIN) 759722 UNIT/GM powder Apply topically 2 times daily Apply 3 times daily. 60 g 3    DULoxetine (CYMBALTA) 30 MG extended release capsule Take

## 2025-02-11 ENCOUNTER — OFFICE VISIT (OUTPATIENT)
Age: 85
End: 2025-02-11
Payer: MEDICARE

## 2025-02-11 VITALS
DIASTOLIC BLOOD PRESSURE: 56 MMHG | WEIGHT: 192 LBS | HEIGHT: 74 IN | SYSTOLIC BLOOD PRESSURE: 110 MMHG | BODY MASS INDEX: 24.64 KG/M2 | HEART RATE: 84 BPM

## 2025-02-11 DIAGNOSIS — I65.23 BILATERAL CAROTID ARTERY STENOSIS: ICD-10-CM

## 2025-02-11 DIAGNOSIS — I35.1 NONRHEUMATIC AORTIC VALVE INSUFFICIENCY: ICD-10-CM

## 2025-02-11 DIAGNOSIS — I49.5 SICK SINUS SYNDROME (HCC): ICD-10-CM

## 2025-02-11 DIAGNOSIS — I50.32 DIASTOLIC CHF, CHRONIC (HCC): ICD-10-CM

## 2025-02-11 DIAGNOSIS — I65.23 BILATERAL CAROTID ARTERY STENOSIS WITHOUT CEREBRAL INFARCTION: ICD-10-CM

## 2025-02-11 DIAGNOSIS — I48.0 PAROXYSMAL ATRIAL FIBRILLATION (HCC): Primary | ICD-10-CM

## 2025-02-11 PROCEDURE — 1036F TOBACCO NON-USER: CPT | Performed by: INTERNAL MEDICINE

## 2025-02-11 PROCEDURE — G8420 CALC BMI NORM PARAMETERS: HCPCS | Performed by: INTERNAL MEDICINE

## 2025-02-11 PROCEDURE — 1126F AMNT PAIN NOTED NONE PRSNT: CPT | Performed by: INTERNAL MEDICINE

## 2025-02-11 PROCEDURE — G8428 CUR MEDS NOT DOCUMENT: HCPCS | Performed by: INTERNAL MEDICINE

## 2025-02-11 PROCEDURE — 1123F ACP DISCUSS/DSCN MKR DOCD: CPT | Performed by: INTERNAL MEDICINE

## 2025-02-11 PROCEDURE — 3078F DIAST BP <80 MM HG: CPT | Performed by: INTERNAL MEDICINE

## 2025-02-11 PROCEDURE — 99214 OFFICE O/P EST MOD 30 MIN: CPT | Performed by: INTERNAL MEDICINE

## 2025-02-11 PROCEDURE — 3074F SYST BP LT 130 MM HG: CPT | Performed by: INTERNAL MEDICINE

## 2025-02-11 RX ORDER — PIOGLITAZONE 30 MG/1
30 TABLET ORAL DAILY
COMMUNITY

## 2025-02-11 RX ORDER — LEVOCETIRIZINE DIHYDROCHLORIDE 5 MG/1
5 TABLET, FILM COATED ORAL NIGHTLY
COMMUNITY

## 2025-02-11 NOTE — PROGRESS NOTES
Independently reviewed prior care notes, any ER records available, cardiac testing, labs and results with the patient and before seeing the patient today.  Also independently reviewed outside records when available.       ASSESSMENT:    Uzair was seen today for congestive heart failure.    Diagnoses and all orders for this visit:    Paroxysmal atrial fibrillation (HCC)    Diastolic CHF, chronic (HCC)    Bilateral carotid artery stenosis    Sick sinus syndrome (HCC)    Nonrheumatic aortic valve insufficiency    Bilateral carotid artery stenosis without cerebral infarction          PLAN: avoid testing, dementia worsening some per wife.     1. CDHF:   Lasix PRN.  2x per week now.  Off aldactone with breast pain.  EF normal.      He has NIELSEN, seems from lack of conditioning and depression.  Seeing PCP as well.        2.  HTN: Off BP meds now.     Labs followed by PCP now, 3 weeks ago.        3.  PAF/SSS:  Off the BB for now.   Follow for SSS, no PM needed now.  Remain on NOAC to 2.5 BID      I have reviewed their anticoagulation treatment, instructed patient to call with any bleeding issues such as melana or other.  The patient will call with any issues related to their treatment.  All questions were answered with the patient voicing understanding.     4. . Carotid Dz:  Follow in the future.     5. CKD  follow       Patient has been instructed and agrees to call our office with any issues or other concerns related to their cardiac condition(s) and/or complaint(s).        Return in about 6 months (around 8/11/2025).       Galileo Vasquez, DO  2/11/2025